# Patient Record
Sex: FEMALE | Race: WHITE | NOT HISPANIC OR LATINO | URBAN - METROPOLITAN AREA
[De-identification: names, ages, dates, MRNs, and addresses within clinical notes are randomized per-mention and may not be internally consistent; named-entity substitution may affect disease eponyms.]

---

## 2017-11-09 ENCOUNTER — OUTPATIENT (OUTPATIENT)
Dept: OUTPATIENT SERVICES | Facility: HOSPITAL | Age: 27
LOS: 1 days | End: 2017-11-09
Payer: COMMERCIAL

## 2017-11-09 VITALS
SYSTOLIC BLOOD PRESSURE: 132 MMHG | DIASTOLIC BLOOD PRESSURE: 85 MMHG | TEMPERATURE: 98 F | RESPIRATION RATE: 14 BRPM | HEART RATE: 75 BPM | HEIGHT: 63 IN | WEIGHT: 173.94 LBS

## 2017-11-09 DIAGNOSIS — Z01.818 ENCOUNTER FOR OTHER PREPROCEDURAL EXAMINATION: ICD-10-CM

## 2017-11-09 DIAGNOSIS — N87.9 DYSPLASIA OF CERVIX UTERI, UNSPECIFIED: ICD-10-CM

## 2017-11-09 DIAGNOSIS — Z98.890 OTHER SPECIFIED POSTPROCEDURAL STATES: Chronic | ICD-10-CM

## 2017-11-09 DIAGNOSIS — Z33.2 ENCOUNTER FOR ELECTIVE TERMINATION OF PREGNANCY: Chronic | ICD-10-CM

## 2017-11-09 DIAGNOSIS — K08.499 PARTIAL LOSS OF TEETH DUE TO OTHER SPECIFIED CAUSE, UNSPECIFIED CLASS: Chronic | ICD-10-CM

## 2017-11-09 LAB
HCG SERPL-ACNC: <1 MIU/ML — SIGNIFICANT CHANGE UP
HCT VFR BLD CALC: 44.8 % — SIGNIFICANT CHANGE UP (ref 34.5–45)
HGB BLD-MCNC: 15.1 G/DL — SIGNIFICANT CHANGE UP (ref 11.5–15.5)
MCHC RBC-ENTMCNC: 30.6 PG — SIGNIFICANT CHANGE UP (ref 27–34)
MCHC RBC-ENTMCNC: 33.7 GM/DL — SIGNIFICANT CHANGE UP (ref 32–36)
MCV RBC AUTO: 90.6 FL — SIGNIFICANT CHANGE UP (ref 80–100)
PLATELET # BLD AUTO: 241 K/UL — SIGNIFICANT CHANGE UP (ref 150–400)
RBC # BLD: 4.94 M/UL — SIGNIFICANT CHANGE UP (ref 3.8–5.2)
RBC # FLD: 11.3 % — SIGNIFICANT CHANGE UP (ref 10.3–14.5)
WBC # BLD: 5.9 K/UL — SIGNIFICANT CHANGE UP (ref 3.8–10.5)
WBC # FLD AUTO: 5.9 K/UL — SIGNIFICANT CHANGE UP (ref 3.8–10.5)

## 2017-11-09 PROCEDURE — G0463: CPT

## 2017-11-09 PROCEDURE — 88321 CONSLTJ&REPRT SLD PREP ELSWR: CPT

## 2017-11-09 PROCEDURE — 86850 RBC ANTIBODY SCREEN: CPT

## 2017-11-09 PROCEDURE — 86901 BLOOD TYPING SEROLOGIC RH(D): CPT

## 2017-11-09 PROCEDURE — 85027 COMPLETE CBC AUTOMATED: CPT

## 2017-11-09 PROCEDURE — 84702 CHORIONIC GONADOTROPIN TEST: CPT

## 2017-11-09 PROCEDURE — 86900 BLOOD TYPING SEROLOGIC ABO: CPT

## 2017-11-09 NOTE — H&P PST ADULT - NSANTHOSAYNRD_GEN_A_CORE
No. KOBE screening performed.  STOP BANG Legend: 0-2 = LOW Risk; 3-4 = INTERMEDIATE Risk; 5-8 = HIGH Risk
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
## 2017-11-09 NOTE — H&P PST ADULT - RS GEN PE MLT RESP DETAILS PC
good air movement/airway patent/breath sounds equal/clear to auscultation bilaterally/respirations non-labored

## 2017-11-09 NOTE — H&P PST ADULT - HISTORY OF PRESENT ILLNESS
27 year old female  presents for PST prior to LEEP Procedure - Endocervical Curettage with Dr Elizalde on 11/15/17 - pt notes h/o abnormal PAP Smear last month - pt notes she then had Colposcopy in office and due to those results pt discussed procedure with Dr Elizalde and is electing for scheduled procedure.

## 2017-11-09 NOTE — H&P PST ADULT - VISION (WITH CORRECTIVE LENSES IF THE PATIENT USUALLY WEARS THEM):
Wears Contact Lenses - instructed pt to wear in eyeglasses on day of surgery/Partially impaired: cannot see medication labels or newsprint, but can see obstacles in path, and the surrounding layout; can count fingers at arm's length

## 2017-11-09 NOTE — H&P PST ADULT - PROBLEM SELECTOR PLAN 1
PST Labs; CBC, HcG, Type & Screen - No medical Clearance needed - pre-op instructions given to pt with understanding verbalized

## 2017-11-09 NOTE — H&P PST ADULT - ASSESSMENT
27 year old female with Dysplasia of cervix Uteri , Unspecified - scheduled for  LEEP Procedure - Endocervical Curettage with Dr Elizalde on 11/15/17 -

## 2017-11-09 NOTE — H&P PST ADULT - FAMILY HISTORY
Father  Still living? Yes, Estimated age: 51-60  Family history of factor V Leiden mutation, Age at diagnosis: Age Unknown

## 2017-11-14 ENCOUNTER — TRANSCRIPTION ENCOUNTER (OUTPATIENT)
Age: 27
End: 2017-11-14

## 2017-11-14 RX ORDER — ACETAMINOPHEN 500 MG
975 TABLET ORAL ONCE
Qty: 0 | Refills: 0 | Status: COMPLETED | OUTPATIENT
Start: 2017-11-15 | End: 2017-11-15

## 2017-11-14 RX ORDER — SODIUM CHLORIDE 9 MG/ML
1000 INJECTION, SOLUTION INTRAVENOUS
Qty: 0 | Refills: 0 | Status: DISCONTINUED | OUTPATIENT
Start: 2017-11-15 | End: 2017-11-15

## 2017-11-15 ENCOUNTER — OUTPATIENT (OUTPATIENT)
Dept: OUTPATIENT SERVICES | Facility: HOSPITAL | Age: 27
LOS: 1 days | End: 2017-11-15
Payer: COMMERCIAL

## 2017-11-15 VITALS
HEART RATE: 73 BPM | SYSTOLIC BLOOD PRESSURE: 112 MMHG | OXYGEN SATURATION: 97 % | DIASTOLIC BLOOD PRESSURE: 60 MMHG | RESPIRATION RATE: 14 BRPM

## 2017-11-15 VITALS
TEMPERATURE: 98 F | RESPIRATION RATE: 16 BRPM | WEIGHT: 175.93 LBS | DIASTOLIC BLOOD PRESSURE: 79 MMHG | SYSTOLIC BLOOD PRESSURE: 135 MMHG | OXYGEN SATURATION: 97 % | HEART RATE: 75 BPM | HEIGHT: 63 IN

## 2017-11-15 DIAGNOSIS — Z01.818 ENCOUNTER FOR OTHER PREPROCEDURAL EXAMINATION: ICD-10-CM

## 2017-11-15 DIAGNOSIS — Z98.890 OTHER SPECIFIED POSTPROCEDURAL STATES: Chronic | ICD-10-CM

## 2017-11-15 DIAGNOSIS — Z33.2 ENCOUNTER FOR ELECTIVE TERMINATION OF PREGNANCY: Chronic | ICD-10-CM

## 2017-11-15 DIAGNOSIS — K08.499 PARTIAL LOSS OF TEETH DUE TO OTHER SPECIFIED CAUSE, UNSPECIFIED CLASS: Chronic | ICD-10-CM

## 2017-11-15 DIAGNOSIS — N87.9 DYSPLASIA OF CERVIX UTERI, UNSPECIFIED: ICD-10-CM

## 2017-11-15 PROCEDURE — 88305 TISSUE EXAM BY PATHOLOGIST: CPT

## 2017-11-15 PROCEDURE — 88305 TISSUE EXAM BY PATHOLOGIST: CPT | Mod: 26

## 2017-11-15 PROCEDURE — 88307 TISSUE EXAM BY PATHOLOGIST: CPT

## 2017-11-15 PROCEDURE — 88307 TISSUE EXAM BY PATHOLOGIST: CPT | Mod: 26

## 2017-11-15 PROCEDURE — 57522 CONIZATION OF CERVIX: CPT

## 2017-11-15 RX ORDER — IBUPROFEN 200 MG
1 TABLET ORAL
Qty: 0 | Refills: 0 | COMMUNITY

## 2017-11-15 RX ORDER — ACETAMINOPHEN 500 MG
2 TABLET ORAL
Qty: 0 | Refills: 0 | COMMUNITY

## 2017-11-15 RX ORDER — NORETHINDRONE AND ETHINYL ESTRADIOL 0.4-0.035
1 KIT ORAL
Qty: 0 | Refills: 0 | COMMUNITY

## 2017-11-15 RX ADMIN — Medication 975 MILLIGRAM(S): at 15:08

## 2017-11-15 RX ADMIN — SODIUM CHLORIDE 30 MILLILITER(S): 9 INJECTION, SOLUTION INTRAVENOUS at 15:08

## 2017-11-15 NOTE — ASU DISCHARGE PLAN (ADULT/PEDIATRIC). - ACTIVITY LEVEL
nothing per vagina/no tub baths/no sports/gym/no douching/no tampons/no intercourse/no exercise/no heavy lifting

## 2017-11-15 NOTE — ASU PATIENT PROFILE, ADULT - VISION (WITH CORRECTIVE LENSES IF THE PATIENT USUALLY WEARS THEM):
Partially impaired: cannot see medication labels or newsprint, but can see obstacles in path, and the surrounding layout; can count fingers at arm's length/Wears Contact Lenses - instructed pt to wear in eyeglasses on day of surgery

## 2017-11-15 NOTE — ASU DISCHARGE PLAN (ADULT/PEDIATRIC). - MEDICATION SUMMARY - MEDICATIONS TO TAKE
I will START or STAY ON the medications listed below when I get home from the hospital:    Tylenol 500 mg oral tablet  -- 2 tab(s) by mouth every 8 hours, As Needed  -- Indication: For pain med    Motrin 800 mg oral tablet  -- 1 tab(s) by mouth 3 times a day, As Needed  -- Indication: For pain med    Loestrin 24 Fe oral tablet  -- 1 tab(s) by mouth once a day (at bedtime)  -- Indication: For home med

## 2017-11-15 NOTE — BRIEF OPERATIVE NOTE - PROCEDURE
<<-----Click on this checkbox to enter Procedure LEEP, with endocervical curettage  11/15/2017    Active  TSANTIAGOE

## 2017-11-15 NOTE — BRIEF OPERATIVE NOTE - OPERATION/FINDINGS
Normal sized anteverted uterus, no adnexal masses. Nonstaining surrounding cervical os. Lesion removed in one pass.

## 2018-10-31 NOTE — ASU PATIENT PROFILE, ADULT - FALL HARM RISK CONCLUSION
[Fatigue] : fatigue [Muscle Weakness] : muscle weakness [Unsteady Walking] : ataxia [Negative] : Heme/Lymph [Recent Change In Weight] : ~T no recent weight change [Chest Pain] : no chest pain [Lower Ext Edema] : no lower extremity edema Universal Safety Interventions [Abdominal Pain] : no abdominal pain [Heartburn] : no heartburn [Melena] : no melena [Suicidal] : not suicidal

## 2019-10-13 NOTE — BRIEF OPERATIVE NOTE - DISPOSITION
Pt presents to the ED with complaints of left upper jaw pain which has been ongoing x 2 months. Denies fever. Bed rails are up and call light is within patient reach. Will continue to monitor.   
home

## 2022-05-31 NOTE — H&P PST ADULT - ANTERIOR CERVICAL R
1. Fasting labs ordered  2. Ref -  Sports medicine - R shoulder  3. Start Miralax daily -   4. Schedule mammogram  5. F/U in 6mth  
normal

## 2022-09-06 PROBLEM — Z00.00 ENCOUNTER FOR PREVENTIVE HEALTH EXAMINATION: Status: ACTIVE | Noted: 2022-09-06

## 2023-08-11 ENCOUNTER — APPOINTMENT (OUTPATIENT)
Dept: ANTEPARTUM | Facility: CLINIC | Age: 33
End: 2023-08-11
Payer: COMMERCIAL

## 2023-08-11 ENCOUNTER — LABORATORY RESULT (OUTPATIENT)
Age: 33
End: 2023-08-11

## 2023-08-11 ENCOUNTER — ASOB RESULT (OUTPATIENT)
Age: 33
End: 2023-08-11

## 2023-08-11 PROCEDURE — 76801 OB US < 14 WKS SINGLE FETUS: CPT

## 2023-08-11 PROCEDURE — 36416 COLLJ CAPILLARY BLOOD SPEC: CPT

## 2023-08-11 PROCEDURE — 76813 OB US NUCHAL MEAS 1 GEST: CPT

## 2023-10-03 ENCOUNTER — ASOB RESULT (OUTPATIENT)
Age: 33
End: 2023-10-03

## 2023-10-03 ENCOUNTER — APPOINTMENT (OUTPATIENT)
Dept: ANTEPARTUM | Facility: CLINIC | Age: 33
End: 2023-10-03
Payer: COMMERCIAL

## 2023-10-03 PROCEDURE — 76811 OB US DETAILED SNGL FETUS: CPT

## 2023-10-03 PROCEDURE — 76817 TRANSVAGINAL US OBSTETRIC: CPT

## 2023-11-12 NOTE — ASU DISCHARGE PLAN (ADULT/PEDIATRIC). - NOTIFY
Pt wears O2 at 2l/nc at home, increased sob for the past few days, started on prednisone and antibx yesterday, neg covid tests at home     Triage Assessment (Adult)       Row Name 11/12/23 1018          Triage Assessment    Airway WDL WDL        Respiratory WDL    Respiratory WDL rhythm/pattern     Rhythm/Pattern, Respiratory shortness of breath        Cognitive/Neuro/Behavioral WDL    Cognitive/Neuro/Behavioral WDL WDL                      Persistent Nausea and Vomiting/Fever greater than 101/Pain not relieved by Medications/Unable to Urinate/Bleeding that does not stop s/p rt kidney tranplant 2 weeks ago, had a follow visit with kidney doctor today and had blood work on with elevated K level and also pt c/o elevated BP and generalized weakness.

## 2024-01-06 ENCOUNTER — INPATIENT (INPATIENT)
Facility: HOSPITAL | Age: 34
LOS: 5 days | Discharge: HOME | End: 2024-01-12
Attending: STUDENT IN AN ORGANIZED HEALTH CARE EDUCATION/TRAINING PROGRAM | Admitting: STUDENT IN AN ORGANIZED HEALTH CARE EDUCATION/TRAINING PROGRAM
Payer: COMMERCIAL

## 2024-01-06 ENCOUNTER — TRANSCRIPTION ENCOUNTER (OUTPATIENT)
Age: 34
End: 2024-01-06

## 2024-01-06 VITALS
SYSTOLIC BLOOD PRESSURE: 133 MMHG | HEART RATE: 82 BPM | TEMPERATURE: 98 F | RESPIRATION RATE: 16 BRPM | DIASTOLIC BLOOD PRESSURE: 82 MMHG

## 2024-01-06 DIAGNOSIS — O42.90 PREMATURE RUPTURE OF MEMBRANES, UNSPECIFIED AS TO LENGTH OF TIME BETWEEN RUPTURE AND ONSET OF LABOR, UNSPECIFIED WEEKS OF GESTATION: ICD-10-CM

## 2024-01-06 DIAGNOSIS — Z96.22 MYRINGOTOMY TUBE(S) STATUS: Chronic | ICD-10-CM

## 2024-01-06 DIAGNOSIS — O26.899 OTHER SPECIFIED PREGNANCY RELATED CONDITIONS, UNSPECIFIED TRIMESTER: ICD-10-CM

## 2024-01-06 DIAGNOSIS — Z98.890 OTHER SPECIFIED POSTPROCEDURAL STATES: Chronic | ICD-10-CM

## 2024-01-06 DIAGNOSIS — Z33.2 ENCOUNTER FOR ELECTIVE TERMINATION OF PREGNANCY: Chronic | ICD-10-CM

## 2024-01-06 LAB
APPEARANCE UR: CLEAR — SIGNIFICANT CHANGE UP
APPEARANCE UR: CLEAR — SIGNIFICANT CHANGE UP
BASOPHILS # BLD AUTO: 0.02 K/UL — SIGNIFICANT CHANGE UP (ref 0–0.2)
BASOPHILS # BLD AUTO: 0.02 K/UL — SIGNIFICANT CHANGE UP (ref 0–0.2)
BASOPHILS NFR BLD AUTO: 0.2 % — SIGNIFICANT CHANGE UP (ref 0–2)
BASOPHILS NFR BLD AUTO: 0.2 % — SIGNIFICANT CHANGE UP (ref 0–2)
BILIRUB UR-MCNC: NEGATIVE — SIGNIFICANT CHANGE UP
BILIRUB UR-MCNC: NEGATIVE — SIGNIFICANT CHANGE UP
BLD GP AB SCN SERPL QL: NEGATIVE — SIGNIFICANT CHANGE UP
BLD GP AB SCN SERPL QL: NEGATIVE — SIGNIFICANT CHANGE UP
COLOR SPEC: YELLOW — SIGNIFICANT CHANGE UP
COLOR SPEC: YELLOW — SIGNIFICANT CHANGE UP
DIFF PNL FLD: NEGATIVE — SIGNIFICANT CHANGE UP
DIFF PNL FLD: NEGATIVE — SIGNIFICANT CHANGE UP
EOSINOPHIL # BLD AUTO: 0.02 K/UL — SIGNIFICANT CHANGE UP (ref 0–0.5)
EOSINOPHIL # BLD AUTO: 0.02 K/UL — SIGNIFICANT CHANGE UP (ref 0–0.5)
EOSINOPHIL NFR BLD AUTO: 0.2 % — SIGNIFICANT CHANGE UP (ref 0–6)
EOSINOPHIL NFR BLD AUTO: 0.2 % — SIGNIFICANT CHANGE UP (ref 0–6)
GLUCOSE UR QL: NEGATIVE MG/DL — SIGNIFICANT CHANGE UP
GLUCOSE UR QL: NEGATIVE MG/DL — SIGNIFICANT CHANGE UP
HCT VFR BLD CALC: 36.3 % — SIGNIFICANT CHANGE UP (ref 34.5–45)
HCT VFR BLD CALC: 36.3 % — SIGNIFICANT CHANGE UP (ref 34.5–45)
HGB BLD-MCNC: 12.7 G/DL — SIGNIFICANT CHANGE UP (ref 11.5–15.5)
HGB BLD-MCNC: 12.7 G/DL — SIGNIFICANT CHANGE UP (ref 11.5–15.5)
IANC: 9.05 K/UL — HIGH (ref 1.8–7.4)
IANC: 9.05 K/UL — HIGH (ref 1.8–7.4)
IMM GRANULOCYTES NFR BLD AUTO: 0.4 % — SIGNIFICANT CHANGE UP (ref 0–0.9)
IMM GRANULOCYTES NFR BLD AUTO: 0.4 % — SIGNIFICANT CHANGE UP (ref 0–0.9)
KETONES UR-MCNC: NEGATIVE MG/DL — SIGNIFICANT CHANGE UP
KETONES UR-MCNC: NEGATIVE MG/DL — SIGNIFICANT CHANGE UP
LEUKOCYTE ESTERASE UR-ACNC: NEGATIVE — SIGNIFICANT CHANGE UP
LEUKOCYTE ESTERASE UR-ACNC: NEGATIVE — SIGNIFICANT CHANGE UP
LYMPHOCYTES # BLD AUTO: 1.41 K/UL — SIGNIFICANT CHANGE UP (ref 1–3.3)
LYMPHOCYTES # BLD AUTO: 1.41 K/UL — SIGNIFICANT CHANGE UP (ref 1–3.3)
LYMPHOCYTES # BLD AUTO: 12.8 % — LOW (ref 13–44)
LYMPHOCYTES # BLD AUTO: 12.8 % — LOW (ref 13–44)
MCHC RBC-ENTMCNC: 30 PG — SIGNIFICANT CHANGE UP (ref 27–34)
MCHC RBC-ENTMCNC: 30 PG — SIGNIFICANT CHANGE UP (ref 27–34)
MCHC RBC-ENTMCNC: 35 GM/DL — SIGNIFICANT CHANGE UP (ref 32–36)
MCHC RBC-ENTMCNC: 35 GM/DL — SIGNIFICANT CHANGE UP (ref 32–36)
MCV RBC AUTO: 85.8 FL — SIGNIFICANT CHANGE UP (ref 80–100)
MCV RBC AUTO: 85.8 FL — SIGNIFICANT CHANGE UP (ref 80–100)
MONOCYTES # BLD AUTO: 0.5 K/UL — SIGNIFICANT CHANGE UP (ref 0–0.9)
MONOCYTES # BLD AUTO: 0.5 K/UL — SIGNIFICANT CHANGE UP (ref 0–0.9)
MONOCYTES NFR BLD AUTO: 4.5 % — SIGNIFICANT CHANGE UP (ref 2–14)
MONOCYTES NFR BLD AUTO: 4.5 % — SIGNIFICANT CHANGE UP (ref 2–14)
NEUTROPHILS # BLD AUTO: 9.05 K/UL — HIGH (ref 1.8–7.4)
NEUTROPHILS # BLD AUTO: 9.05 K/UL — HIGH (ref 1.8–7.4)
NEUTROPHILS NFR BLD AUTO: 81.9 % — HIGH (ref 43–77)
NEUTROPHILS NFR BLD AUTO: 81.9 % — HIGH (ref 43–77)
NITRITE UR-MCNC: NEGATIVE — SIGNIFICANT CHANGE UP
NITRITE UR-MCNC: NEGATIVE — SIGNIFICANT CHANGE UP
NRBC # BLD: 0 /100 WBCS — SIGNIFICANT CHANGE UP (ref 0–0)
NRBC # BLD: 0 /100 WBCS — SIGNIFICANT CHANGE UP (ref 0–0)
NRBC # FLD: 0 K/UL — SIGNIFICANT CHANGE UP (ref 0–0)
NRBC # FLD: 0 K/UL — SIGNIFICANT CHANGE UP (ref 0–0)
PH UR: 6 — SIGNIFICANT CHANGE UP (ref 5–8)
PH UR: 6 — SIGNIFICANT CHANGE UP (ref 5–8)
PLATELET # BLD AUTO: 195 K/UL — SIGNIFICANT CHANGE UP (ref 150–400)
PLATELET # BLD AUTO: 195 K/UL — SIGNIFICANT CHANGE UP (ref 150–400)
PROT UR-MCNC: NEGATIVE MG/DL — SIGNIFICANT CHANGE UP
PROT UR-MCNC: NEGATIVE MG/DL — SIGNIFICANT CHANGE UP
RBC # BLD: 4.23 M/UL — SIGNIFICANT CHANGE UP (ref 3.8–5.2)
RBC # BLD: 4.23 M/UL — SIGNIFICANT CHANGE UP (ref 3.8–5.2)
RBC # FLD: 12.8 % — SIGNIFICANT CHANGE UP (ref 10.3–14.5)
RBC # FLD: 12.8 % — SIGNIFICANT CHANGE UP (ref 10.3–14.5)
RH IG SCN BLD-IMP: POSITIVE — SIGNIFICANT CHANGE UP
SP GR SPEC: 1.01 — SIGNIFICANT CHANGE UP (ref 1–1.03)
SP GR SPEC: 1.01 — SIGNIFICANT CHANGE UP (ref 1–1.03)
UROBILINOGEN FLD QL: 0.2 MG/DL — SIGNIFICANT CHANGE UP (ref 0.2–1)
UROBILINOGEN FLD QL: 0.2 MG/DL — SIGNIFICANT CHANGE UP (ref 0.2–1)
WBC # BLD: 11.04 K/UL — HIGH (ref 3.8–10.5)
WBC # BLD: 11.04 K/UL — HIGH (ref 3.8–10.5)
WBC # FLD AUTO: 11.04 K/UL — HIGH (ref 3.8–10.5)
WBC # FLD AUTO: 11.04 K/UL — HIGH (ref 3.8–10.5)

## 2024-01-06 RX ORDER — ERYTHROMYCIN ETHYLSUCCINATE 400 MG
TABLET ORAL
Refills: 0 | Status: DISCONTINUED | OUTPATIENT
Start: 2024-01-06 | End: 2024-01-06

## 2024-01-06 RX ORDER — INFLUENZA VIRUS VACCINE 15; 15; 15; 15 UG/.5ML; UG/.5ML; UG/.5ML; UG/.5ML
0.5 SUSPENSION INTRAMUSCULAR ONCE
Refills: 0 | Status: COMPLETED | OUTPATIENT
Start: 2024-01-06 | End: 2024-01-06

## 2024-01-06 RX ORDER — SODIUM CHLORIDE 9 MG/ML
1000 INJECTION, SOLUTION INTRAVENOUS
Refills: 0 | Status: DISCONTINUED | OUTPATIENT
Start: 2024-01-06 | End: 2024-01-12

## 2024-01-06 RX ORDER — AZITHROMYCIN 500 MG/1
1000 TABLET, FILM COATED ORAL ONCE
Refills: 0 | Status: COMPLETED | OUTPATIENT
Start: 2024-01-06 | End: 2024-01-06

## 2024-01-06 RX ORDER — SODIUM CHLORIDE 9 MG/ML
1000 INJECTION, SOLUTION INTRAVENOUS
Refills: 0 | Status: DISCONTINUED | OUTPATIENT
Start: 2024-01-06 | End: 2024-01-06

## 2024-01-06 RX ORDER — GENTAMICIN SULFATE 40 MG/ML
400 VIAL (ML) INJECTION DAILY
Refills: 0 | Status: DISCONTINUED | OUTPATIENT
Start: 2024-01-06 | End: 2024-01-07

## 2024-01-06 RX ORDER — CHLORHEXIDINE GLUCONATE 213 G/1000ML
1 SOLUTION TOPICAL DAILY
Refills: 0 | Status: DISCONTINUED | OUTPATIENT
Start: 2024-01-06 | End: 2024-01-06

## 2024-01-06 RX ORDER — CHLORHEXIDINE GLUCONATE 213 G/1000ML
1 SOLUTION TOPICAL DAILY
Refills: 0 | Status: DISCONTINUED | OUTPATIENT
Start: 2024-01-06 | End: 2024-01-12

## 2024-01-06 RX ADMIN — AZITHROMYCIN 1000 MILLIGRAM(S): 500 TABLET, FILM COATED ORAL at 15:48

## 2024-01-06 RX ADMIN — Medication 100 MILLIGRAM(S): at 15:45

## 2024-01-06 RX ADMIN — SODIUM CHLORIDE 125 MILLILITER(S): 9 INJECTION, SOLUTION INTRAVENOUS at 15:45

## 2024-01-06 RX ADMIN — Medication 250 MILLIGRAM(S): at 16:31

## 2024-01-06 RX ADMIN — Medication 100 MILLIGRAM(S): at 23:44

## 2024-01-06 RX ADMIN — Medication 12 MILLIGRAM(S): at 14:55

## 2024-01-06 RX ADMIN — CHLORHEXIDINE GLUCONATE 1 APPLICATION(S): 213 SOLUTION TOPICAL at 15:46

## 2024-01-06 NOTE — OB PROVIDER TRIAGE NOTE - NSHPPHYSICALEXAM_GEN_ALL_CORE
pt seen and examined    ICU Vital Signs Last 24 Hrs  T(C): 37.0 (06 Jan 2024 14:42), Max: 37.0 (06 Jan 2024 14:42)  T(F): 98.6 (06 Jan 2024 14:42), Max: 98.6 (06 Jan 2024 14:42)  HR: 83 (06 Jan 2024 15:12) (75 - 92)  BP: 131/76 (06 Jan 2024 15:12) (125/72 - 135/77)  RR: 14 (06 Jan 2024 14:42) (14 - 16)  SpO2: 97% (06 Jan 2024 14:28) (93% - 99%)  pt in NAD   lungs clear   heart s1 s2   abd soft  gravid non tender    placed on EFM     NST  reactive     with accelerations  moderate variability  no decelerations

## 2024-01-06 NOTE — OB RN TRIAGE NOTE - FALL HARM RISK - UNIVERSAL INTERVENTIONS
Bed in lowest position, wheels locked, appropriate side rails in place/Call bell, personal items and telephone in reach/Instruct patient to call for assistance before getting out of bed or chair/Non-slip footwear when patient is out of bed/[place] to call system/Physically safe environment - no spills, clutter or unnecessary equipment/Purposeful Proactive Rounding/Room/bathroom lighting operational, light cord in reach Bed in lowest position, wheels locked, appropriate side rails in place/Call bell, personal items and telephone in reach/Instruct patient to call for assistance before getting out of bed or chair/Non-slip footwear when patient is out of bed/[place] to call system/Physically safe environment - no spills, clutter or unnecessary equipment/Purposeful Proactive Rounding/Room/bathroom lighting operational, light cord in reach

## 2024-01-06 NOTE — OB RN PATIENT PROFILE - CAREGIVER
Take 5mg of Zomig as needed for migraine.    ?? Reglan in future for nausea/vomiting.    ?? Dr. Troy (memory specialist).   Declines

## 2024-01-06 NOTE — OB PROVIDER H&P - NSNYCREQUIREMENTS_OBGYN_ALL_OB
Patient needs a refill for testing strips her meter is a Accu-Chek Guide.  
Rx sent for  test strips  
URIEL

## 2024-01-06 NOTE — OB RN PATIENT PROFILE - CURRENT PREGNANCY COMPLICATIONS, OB PROFILE
deformity or signs of injury. Neurological: He is alert. Skin: Skin is warm and dry. Capillary refill takes less than 3 seconds. Turgor is normal. No petechiae noted. No jaundice. DIAGNOSTIC RESULTS     RADIOLOGY:   Non-plain film images such as CT, Ultrasound and MRI are read by the radiologist. Plain radiographic images are visualized and preliminarily interpreted by Lobo Kaplan PA-C with the below findings:    NAD  Interpretation per the Radiologist below, if available at the time of this note:    XR CHEST STANDARD (2 VW)    (Results Pending)       LABS:  Labs Reviewed   RAPID INFLUENZA A/B ANTIGENS   RSV RAPID ANTIGEN       All other labs were within normal range or not returned as of this dictation. EMERGENCY DEPARTMENT COURSE and DIFFERENTIAL DIAGNOSIS/MDM:   Vitals:    Vitals:    01/08/18 1915   Pulse: 145   Resp: 28   Temp: 99.9 °F (37.7 °C)   TempSrc: Rectal   SpO2: 99%   Weight: (!) 19 lb 2.9 oz (8.7 kg)       REASSESSMENT     ED Course        Patient presents to the emergency department with mom after having URI symptoms for the past 3 weeks. Mom does state that he seemed to worsen the past few days. Tylenol has not been helping the symptoms. Given the extensive amount time the symptoms been ongoing and will use a short course of antibiotics for the patient to ensure no bacterial etiology is the causative factors of the symptoms. Follow-up with PCP in the next 3-4 days    MDM    PROCEDURES:    Procedures      FINAL IMPRESSION      1.  Upper respiratory tract infection, unspecified type          DISPOSITION/PLAN   DISPOSITION Decision To Discharge 01/08/2018 09:02:40 PM      PATIENT REFERRED TO:  Delio Fontaine MD  [address]  [phone]    Call in 3 days        DISCHARGE MEDICATIONS:  New Prescriptions    AZITHROMYCIN (ZITHROMAX) 100 MG/5ML SUSPENSION    Take 2 mLs by mouth daily for 5 days       (Please note that portions of this note were completed with a  Premature Rupture of Membranes (PPROM)/Gestational Age less than 36 Weeks/Maternal Unknown GBS

## 2024-01-06 NOTE — OB PROVIDER TRIAGE NOTE - HISTORY OF PRESENT ILLNESS
33 year old female  at 33.3 weeks gestation who stated LOF at 1140a and feels cramping    feels good FM  feels mild cramping     PNC  WHP     fetus breech on 1/3 scan for CS 2/16 if still breech    hx of LEEP normal cx lengths

## 2024-01-06 NOTE — OB PROVIDER H&P - ASSESSMENT
33 year old female P0 at 33.3 week gestation BREECH /pprom    for expectant management     betamethasone for FLM     latency antibiotic coverage      NICU consult    maintain NPO status    admission labs sent      PRIMITIVO MCKENZIE

## 2024-01-06 NOTE — OB RN PATIENT PROFILE - NAME OF FATHER, OB PROFILE
Patient is due for DM eye exam.   Referrals for both Dr Fidelia Rodriguez and Dr Sylvester Del Valle authorized. Patient states that he had exam done with Dr Sylvester Del Valle one month ago. Parth Templeton at Dr Valente Apley office will fax to PCP.
Vincent Lewis

## 2024-01-06 NOTE — OB RN PATIENT PROFILE - IN THE PAST 12 MONTHS HAVE YOU USED DRUGS OTHER THAN THOSE REQUIRED FOR MEDICAL REASON?
----- Message from Marcus Mcclure MD sent at 6/21/2021  2:59 PM CDT -----  Stable renal function with creatinine 2.83.  PT/PTT within normal limits.  Patient is medically cleared to proceed with elective surgery for spinal stenosis.   No

## 2024-01-06 NOTE — OB RN PATIENT PROFILE - FALL HARM RISK - UNIVERSAL INTERVENTIONS
Bed in lowest position, wheels locked, appropriate side rails in place/Call bell, personal items and telephone in reach/Instruct patient to call for assistance before getting out of bed or chair/Non-slip footwear when patient is out of bed/[place] to call system/Physically safe environment - no spills, clutter or unnecessary equipment/Purposeful Proactive Rounding/Room/bathroom lighting operational, light cord in reach Bed in lowest position, wheels locked, appropriate side rails in place/Call bell, personal items and telephone in reach/Instruct patient to call for assistance before getting out of bed or chair/Non-slip footwear when patient is out of bed/[place] to call system/Physically safe environment - no spills, clutter or unnecessary equipment/Purposeful Proactive Rounding/Room/bathroom lighting operational, light cord in reach

## 2024-01-06 NOTE — CHART NOTE - NSCHARTNOTEFT_GEN_A_CORE
NICU consulted placed   Update: Confirmed NICU to see patient per MICU Fellow Mina. Amyeo Afroz Jereen, PGY-3 NICU consulted placed 1/6 @2300  Update: Confirmed NICU to see patient per NICU Fellow Aayush. 1/7 @0500    Amyeo Afroz Jereen, PGY-3

## 2024-01-07 ENCOUNTER — TRANSCRIPTION ENCOUNTER (OUTPATIENT)
Age: 34
End: 2024-01-07

## 2024-01-07 LAB
ANION GAP SERPL CALC-SCNC: 16 MMOL/L — HIGH (ref 7–14)
ANION GAP SERPL CALC-SCNC: 16 MMOL/L — HIGH (ref 7–14)
BASOPHILS # BLD AUTO: 0.01 K/UL — SIGNIFICANT CHANGE UP (ref 0–0.2)
BASOPHILS # BLD AUTO: 0.01 K/UL — SIGNIFICANT CHANGE UP (ref 0–0.2)
BASOPHILS NFR BLD AUTO: 0.1 % — SIGNIFICANT CHANGE UP (ref 0–2)
BASOPHILS NFR BLD AUTO: 0.1 % — SIGNIFICANT CHANGE UP (ref 0–2)
BUN SERPL-MCNC: 11 MG/DL — SIGNIFICANT CHANGE UP (ref 7–23)
BUN SERPL-MCNC: 11 MG/DL — SIGNIFICANT CHANGE UP (ref 7–23)
CALCIUM SERPL-MCNC: 9.3 MG/DL — SIGNIFICANT CHANGE UP (ref 8.4–10.5)
CALCIUM SERPL-MCNC: 9.3 MG/DL — SIGNIFICANT CHANGE UP (ref 8.4–10.5)
CHLORIDE SERPL-SCNC: 99 MMOL/L — SIGNIFICANT CHANGE UP (ref 98–107)
CHLORIDE SERPL-SCNC: 99 MMOL/L — SIGNIFICANT CHANGE UP (ref 98–107)
CO2 SERPL-SCNC: 18 MMOL/L — LOW (ref 22–31)
CO2 SERPL-SCNC: 18 MMOL/L — LOW (ref 22–31)
CREAT SERPL-MCNC: 0.59 MG/DL — SIGNIFICANT CHANGE UP (ref 0.5–1.3)
CREAT SERPL-MCNC: 0.59 MG/DL — SIGNIFICANT CHANGE UP (ref 0.5–1.3)
EGFR: 121 ML/MIN/1.73M2 — SIGNIFICANT CHANGE UP
EGFR: 121 ML/MIN/1.73M2 — SIGNIFICANT CHANGE UP
EOSINOPHIL # BLD AUTO: 0 K/UL — SIGNIFICANT CHANGE UP (ref 0–0.5)
EOSINOPHIL # BLD AUTO: 0 K/UL — SIGNIFICANT CHANGE UP (ref 0–0.5)
EOSINOPHIL NFR BLD AUTO: 0 % — SIGNIFICANT CHANGE UP (ref 0–6)
EOSINOPHIL NFR BLD AUTO: 0 % — SIGNIFICANT CHANGE UP (ref 0–6)
GLUCOSE SERPL-MCNC: 79 MG/DL — SIGNIFICANT CHANGE UP (ref 70–99)
GLUCOSE SERPL-MCNC: 79 MG/DL — SIGNIFICANT CHANGE UP (ref 70–99)
HCT VFR BLD CALC: 33.9 % — LOW (ref 34.5–45)
HCT VFR BLD CALC: 33.9 % — LOW (ref 34.5–45)
HGB BLD-MCNC: 12 G/DL — SIGNIFICANT CHANGE UP (ref 11.5–15.5)
HGB BLD-MCNC: 12 G/DL — SIGNIFICANT CHANGE UP (ref 11.5–15.5)
IANC: 8.6 K/UL — HIGH (ref 1.8–7.4)
IANC: 8.6 K/UL — HIGH (ref 1.8–7.4)
IMM GRANULOCYTES NFR BLD AUTO: 0.4 % — SIGNIFICANT CHANGE UP (ref 0–0.9)
IMM GRANULOCYTES NFR BLD AUTO: 0.4 % — SIGNIFICANT CHANGE UP (ref 0–0.9)
LYMPHOCYTES # BLD AUTO: 0.97 K/UL — LOW (ref 1–3.3)
LYMPHOCYTES # BLD AUTO: 0.97 K/UL — LOW (ref 1–3.3)
LYMPHOCYTES # BLD AUTO: 9.8 % — LOW (ref 13–44)
LYMPHOCYTES # BLD AUTO: 9.8 % — LOW (ref 13–44)
MCHC RBC-ENTMCNC: 30 PG — SIGNIFICANT CHANGE UP (ref 27–34)
MCHC RBC-ENTMCNC: 30 PG — SIGNIFICANT CHANGE UP (ref 27–34)
MCHC RBC-ENTMCNC: 35.4 GM/DL — SIGNIFICANT CHANGE UP (ref 32–36)
MCHC RBC-ENTMCNC: 35.4 GM/DL — SIGNIFICANT CHANGE UP (ref 32–36)
MCV RBC AUTO: 84.8 FL — SIGNIFICANT CHANGE UP (ref 80–100)
MCV RBC AUTO: 84.8 FL — SIGNIFICANT CHANGE UP (ref 80–100)
MONOCYTES # BLD AUTO: 0.24 K/UL — SIGNIFICANT CHANGE UP (ref 0–0.9)
MONOCYTES # BLD AUTO: 0.24 K/UL — SIGNIFICANT CHANGE UP (ref 0–0.9)
MONOCYTES NFR BLD AUTO: 2.4 % — SIGNIFICANT CHANGE UP (ref 2–14)
MONOCYTES NFR BLD AUTO: 2.4 % — SIGNIFICANT CHANGE UP (ref 2–14)
NEUTROPHILS # BLD AUTO: 8.6 K/UL — HIGH (ref 1.8–7.4)
NEUTROPHILS # BLD AUTO: 8.6 K/UL — HIGH (ref 1.8–7.4)
NEUTROPHILS NFR BLD AUTO: 87.3 % — HIGH (ref 43–77)
NEUTROPHILS NFR BLD AUTO: 87.3 % — HIGH (ref 43–77)
NRBC # BLD: 0 /100 WBCS — SIGNIFICANT CHANGE UP (ref 0–0)
NRBC # BLD: 0 /100 WBCS — SIGNIFICANT CHANGE UP (ref 0–0)
NRBC # FLD: 0 K/UL — SIGNIFICANT CHANGE UP (ref 0–0)
NRBC # FLD: 0 K/UL — SIGNIFICANT CHANGE UP (ref 0–0)
PLATELET # BLD AUTO: 187 K/UL — SIGNIFICANT CHANGE UP (ref 150–400)
PLATELET # BLD AUTO: 187 K/UL — SIGNIFICANT CHANGE UP (ref 150–400)
POTASSIUM SERPL-MCNC: 4.1 MMOL/L — SIGNIFICANT CHANGE UP (ref 3.5–5.3)
POTASSIUM SERPL-MCNC: 4.1 MMOL/L — SIGNIFICANT CHANGE UP (ref 3.5–5.3)
POTASSIUM SERPL-SCNC: 4.1 MMOL/L — SIGNIFICANT CHANGE UP (ref 3.5–5.3)
POTASSIUM SERPL-SCNC: 4.1 MMOL/L — SIGNIFICANT CHANGE UP (ref 3.5–5.3)
RBC # BLD: 4 M/UL — SIGNIFICANT CHANGE UP (ref 3.8–5.2)
RBC # BLD: 4 M/UL — SIGNIFICANT CHANGE UP (ref 3.8–5.2)
RBC # FLD: 12.7 % — SIGNIFICANT CHANGE UP (ref 10.3–14.5)
RBC # FLD: 12.7 % — SIGNIFICANT CHANGE UP (ref 10.3–14.5)
SODIUM SERPL-SCNC: 133 MMOL/L — LOW (ref 135–145)
SODIUM SERPL-SCNC: 133 MMOL/L — LOW (ref 135–145)
T PALLIDUM AB TITR SER: NEGATIVE — SIGNIFICANT CHANGE UP
T PALLIDUM AB TITR SER: NEGATIVE — SIGNIFICANT CHANGE UP
WBC # BLD: 9.86 K/UL — SIGNIFICANT CHANGE UP (ref 3.8–10.5)
WBC # BLD: 9.86 K/UL — SIGNIFICANT CHANGE UP (ref 3.8–10.5)
WBC # FLD AUTO: 9.86 K/UL — SIGNIFICANT CHANGE UP (ref 3.8–10.5)
WBC # FLD AUTO: 9.86 K/UL — SIGNIFICANT CHANGE UP (ref 3.8–10.5)

## 2024-01-07 PROCEDURE — 88307 TISSUE EXAM BY PATHOLOGIST: CPT | Mod: 26

## 2024-01-07 RX ORDER — LANOLIN
1 OINTMENT (GRAM) TOPICAL EVERY 6 HOURS
Refills: 0 | Status: DISCONTINUED | OUTPATIENT
Start: 2024-01-07 | End: 2024-01-12

## 2024-01-07 RX ORDER — IBUPROFEN 200 MG
600 TABLET ORAL EVERY 6 HOURS
Refills: 0 | Status: COMPLETED | OUTPATIENT
Start: 2024-01-07 | End: 2024-12-05

## 2024-01-07 RX ORDER — SODIUM CHLORIDE 9 MG/ML
1000 INJECTION, SOLUTION INTRAVENOUS
Refills: 0 | Status: DISCONTINUED | OUTPATIENT
Start: 2024-01-07 | End: 2024-01-12

## 2024-01-07 RX ORDER — KETOROLAC TROMETHAMINE 30 MG/ML
30 SYRINGE (ML) INJECTION EVERY 6 HOURS
Refills: 0 | Status: DISCONTINUED | OUTPATIENT
Start: 2024-01-07 | End: 2024-01-08

## 2024-01-07 RX ORDER — OXYCODONE HYDROCHLORIDE 5 MG/1
5 TABLET ORAL
Refills: 0 | Status: DISCONTINUED | OUTPATIENT
Start: 2024-01-07 | End: 2024-01-12

## 2024-01-07 RX ORDER — CITRIC ACID/SODIUM CITRATE 300-500 MG
30 SOLUTION, ORAL ORAL ONCE
Refills: 0 | Status: COMPLETED | OUTPATIENT
Start: 2024-01-07 | End: 2024-01-07

## 2024-01-07 RX ORDER — HEPARIN SODIUM 5000 [USP'U]/ML
5000 INJECTION INTRAVENOUS; SUBCUTANEOUS EVERY 12 HOURS
Refills: 0 | Status: DISCONTINUED | OUTPATIENT
Start: 2024-01-07 | End: 2024-01-12

## 2024-01-07 RX ORDER — MAGNESIUM HYDROXIDE 400 MG/1
30 TABLET, CHEWABLE ORAL
Refills: 0 | Status: DISCONTINUED | OUTPATIENT
Start: 2024-01-07 | End: 2024-01-12

## 2024-01-07 RX ORDER — SIMETHICONE 80 MG/1
80 TABLET, CHEWABLE ORAL EVERY 4 HOURS
Refills: 0 | Status: DISCONTINUED | OUTPATIENT
Start: 2024-01-07 | End: 2024-01-12

## 2024-01-07 RX ORDER — ONDANSETRON 8 MG/1
4 TABLET, FILM COATED ORAL EVERY 6 HOURS
Refills: 0 | Status: DISCONTINUED | OUTPATIENT
Start: 2024-01-07 | End: 2024-01-12

## 2024-01-07 RX ORDER — ACETAMINOPHEN 500 MG
975 TABLET ORAL
Refills: 0 | Status: DISCONTINUED | OUTPATIENT
Start: 2024-01-07 | End: 2024-01-12

## 2024-01-07 RX ORDER — FAMOTIDINE 10 MG/ML
20 INJECTION INTRAVENOUS ONCE
Refills: 0 | Status: COMPLETED | OUTPATIENT
Start: 2024-01-07 | End: 2024-01-07

## 2024-01-07 RX ORDER — OXYCODONE HYDROCHLORIDE 5 MG/1
5 TABLET ORAL ONCE
Refills: 0 | Status: DISCONTINUED | OUTPATIENT
Start: 2024-01-07 | End: 2024-01-12

## 2024-01-07 RX ORDER — OXYTOCIN 10 UNIT/ML
333.33 VIAL (ML) INJECTION
Qty: 20 | Refills: 0 | Status: DISCONTINUED | OUTPATIENT
Start: 2024-01-07 | End: 2024-01-12

## 2024-01-07 RX ORDER — NALOXONE HYDROCHLORIDE 4 MG/.1ML
0.1 SPRAY NASAL
Refills: 0 | Status: DISCONTINUED | OUTPATIENT
Start: 2024-01-07 | End: 2024-01-12

## 2024-01-07 RX ORDER — TETANUS TOXOID, REDUCED DIPHTHERIA TOXOID AND ACELLULAR PERTUSSIS VACCINE, ADSORBED 5; 2.5; 8; 8; 2.5 [IU]/.5ML; [IU]/.5ML; UG/.5ML; UG/.5ML; UG/.5ML
0.5 SUSPENSION INTRAMUSCULAR ONCE
Refills: 0 | Status: DISCONTINUED | OUTPATIENT
Start: 2024-01-07 | End: 2024-01-12

## 2024-01-07 RX ORDER — MORPHINE SULFATE 50 MG/1
0.1 CAPSULE, EXTENDED RELEASE ORAL ONCE
Refills: 0 | Status: DISCONTINUED | OUTPATIENT
Start: 2024-01-07 | End: 2024-01-12

## 2024-01-07 RX ORDER — DIPHENHYDRAMINE HCL 50 MG
25 CAPSULE ORAL EVERY 6 HOURS
Refills: 0 | Status: DISCONTINUED | OUTPATIENT
Start: 2024-01-07 | End: 2024-01-12

## 2024-01-07 RX ORDER — ACETAMINOPHEN 500 MG
975 TABLET ORAL ONCE
Refills: 0 | Status: COMPLETED | OUTPATIENT
Start: 2024-01-07 | End: 2024-01-07

## 2024-01-07 RX ORDER — DEXAMETHASONE 0.5 MG/5ML
4 ELIXIR ORAL EVERY 6 HOURS
Refills: 0 | Status: DISCONTINUED | OUTPATIENT
Start: 2024-01-07 | End: 2024-01-12

## 2024-01-07 RX ADMIN — Medication 30 MILLILITER(S): at 11:19

## 2024-01-07 RX ADMIN — Medication 30 MILLIGRAM(S): at 20:37

## 2024-01-07 RX ADMIN — FAMOTIDINE 20 MILLIGRAM(S): 10 INJECTION INTRAVENOUS at 11:19

## 2024-01-07 RX ADMIN — Medication 30 MILLIGRAM(S): at 21:37

## 2024-01-07 RX ADMIN — Medication 100 MILLIGRAM(S): at 07:53

## 2024-01-07 RX ADMIN — Medication 975 MILLIGRAM(S): at 08:25

## 2024-01-07 RX ADMIN — HEPARIN SODIUM 5000 UNIT(S): 5000 INJECTION INTRAVENOUS; SUBCUTANEOUS at 20:36

## 2024-01-07 RX ADMIN — ONDANSETRON 4 MILLIGRAM(S): 8 TABLET, FILM COATED ORAL at 21:48

## 2024-01-07 RX ADMIN — Medication 975 MILLIGRAM(S): at 07:46

## 2024-01-07 NOTE — DISCHARGE NOTE OB - MEDICATION SUMMARY - MEDICATIONS TO TAKE
I will START or STAY ON the medications listed below when I get home from the hospital:    ibuprofen 600 mg oral tablet  -- 1 tab(s) by mouth every 6 hours as needed for  moderate pain  -- Indication: For pain    acetaminophen 325 mg oral tablet  -- 3 tab(s) by mouth every 6 hours as needed for  moderate pain  -- Indication: For pain    ferrous sulfate 325 mg (65 mg elemental iron) oral tablet  -- 1 tab(s) by mouth once a day  -- Indication: For Anemia    ascorbic acid 500 mg oral tablet  -- 1 tab(s) by mouth once a day  -- Indication: For nutrition   I will START or STAY ON the medications listed below when I get home from the hospital:    ibuprofen 600 mg oral tablet  -- 1 tab(s) by mouth every 6 hours as needed for  moderate pain  -- Indication: For pain    acetaminophen 325 mg oral tablet  -- 3 tab(s) by mouth every 6 hours as needed for  moderate pain  -- Indication: For pain    valACYclovir 1 g oral tablet  -- 1 tab(s) by mouth once a day  -- Indication: For HSV     NIFEdipine 30 mg oral tablet, extended release  -- 1 tab(s) by mouth every 24 hours hold for BP <110/60, HR less than 60  -- Indication: For HTN    ferrous sulfate 325 mg (65 mg elemental iron) oral tablet  -- 1 tab(s) by mouth once a day  -- Indication: For Anemia    ascorbic acid 500 mg oral tablet  -- 1 tab(s) by mouth once a day  -- Indication: For nutrition

## 2024-01-07 NOTE — DISCHARGE NOTE OB - PATIENT PORTAL LINK FT
You can access the FollowMyHealth Patient Portal offered by [facility] by registering at the following website: [email or web address]. By joining [employer]’s FollowMyHealth portal, you will also be able to view your health information using other applications (apps) compatible with our system. You can access the FollowMyHealth Patient Portal offered by [facility] by registering at the following website: [email or web address]. By joining [facility]’s FollowMyHealth portal, you will also be able to view your health information using other applications (apps) compatible with our system.

## 2024-01-07 NOTE — OB PROVIDER DELIVERY SUMMARY - NSPROVIDERDELIVERYNOTE_OBGYN_ALL_OB_FT
Patient taken for PCS for breech presentation in  labor. Live male  delivered via breech extraction. Cord clamped and cut immediately and  handed to pediatricians. Cord gas obtained. Placenta delivered intact. Hysterotomy closed with vicryl. Fascia closed with vicryl. Subcutaneous layer reapproximated with 2-0 plain in 2 layers. Skin closed with vicryl. QBL 295cc. Patient taken for PCS for breech presentation in  labor. Live male  delivered via breech extraction. Cord clamped and cut immediately and  handed to pediatricians. Cord gas obtained. Placenta delivered intact. Hysterotomy closed with vicryl. Fascia closed with vicryl. Subcutaneous layer reapproximated with 2-0 plain in 2 layers. Skin closed with vicryl. MAKSIMBL 295cc.    Dictation #: [id number] Patient taken for PCS for breech presentation in  labor. Live male  delivered via breech extraction. Cord clamped and cut immediately and  handed to pediatricians. Cord gas obtained. Placenta delivered intact. Hysterotomy closed with vicryl. Fascia closed with vicryl. Subcutaneous layer reapproximated with 2-0 plain in 2 layers. Skin closed with vicryl. MAKSIMBL 295cc.    Dictation #: [id number]

## 2024-01-07 NOTE — OB PROVIDER DELIVERY SUMMARY - NSSELHIDDEN_OBGYN_ALL_OB_FT
[NS_DeliveryAttending1_OBGYN_ALL_OB_FT:[id number]],[NS_DeliveryAssist1_OBGYN_ALL_OB_FT:[id number]],[NS_DeliveryRN_OBGYN_ALL_OB_FT:[id number]] [NS_DeliveryAttending1_OBGYN_ALL_OB_FT:[id number]],[NS_DeliveryAssist1_OBGYN_ALL_OB_FT:[id number]],[NS_DeliveryRN_OBGYN_ALL_OB_FT:[id number]==]

## 2024-01-07 NOTE — DISCHARGE NOTE OB - NS AS DC FOLLOWUP INST YN
ANTICOAGULATION  MANAGEMENT PROGRAM    Eileen Donald is overdue for INR check.  Reminder call made.    Spoke with Marilee who declined to schedule INR at this time.  If calling back, please schedule INR check as soon as possible.    Radha Slater RN     Yes

## 2024-01-07 NOTE — DISCHARGE NOTE OB - CARE PROVIDER_API CALL
Esthela Hinojosa  Obstetrics and Gynecology  [address] [place] [address]  Phone: [phone]  Fax: [phone]  Follow Up Time:    Esthela Hinojosa  Obstetrics and Gynecology  [address] [place] [id number]  Phone: [phone]  Fax: [phone]  Follow Up Time:

## 2024-01-07 NOTE — PROGRESS NOTE ADULT - SUBJECTIVE AND OBJECTIVE BOX
R3 Antepartum Note, HD#2    Patient seen and examined at in PACU. Pt reports +FM, + LOF, and reports her contractions had spaced out to q1hr at 3/10 pain, however the last hour has had contractions that are 6/10 every 30m. Denies VB, HA, epigastric pain, blurred vision, CP, SOB, N/V, fevers, and chills.    Vital Signs Last 24 Hours  T(C): 37.0 (01-07-24 @ 05:05), Max: 37.3 (01-06-24 @ 20:39)  HR: 85 (01-07-24 @ 06:09) (72 - 92)  BP: 114/60 (01-07-24 @ 06:09) (102/51 - 136/77)  RR: 14 (01-07-24 @ 05:05) (14 - 16)  SpO2: 97% (01-06-24 @ 14:28) (93% - 99%)    CAPILLARY BLOOD GLUCOSE          Physical Exam:  General: NAD  Abdomen: Soft, non-tender, gravid  Ext: No pain or swelling    Continuous tracing reactive overnight    Labs:             12.7   11.04 )-----------( 195      ( 01-06 @ 15:10 )             36.3                   MEDICATIONS  (STANDING):  betamethasone Injectable 12 milliGRAM(s) IntraMuscular every 24 hours  chlorhexidine 2% Cloths 1 Application(s) Topical daily  clindamycin   Capsule 300 milliGRAM(s) Oral every 8 hours  clindamycin IVPB 900 milliGRAM(s) IV Intermittent every 8 hours  gentamicin   IVPB 400 milliGRAM(s) IV Intermittent daily  influenza   Vaccine 0.5 milliLiter(s) IntraMuscular once  lactated ringers. 1000 milliLiter(s) (125 mL/Hr) IV Continuous <Continuous>   R3 Antepartum Note, HD#2    Patient seen and examined at in PACU. Pt reports +FM, + LOF, and reports her contractions had spaced out to q1hr at 3/10 pain, however the last hour has had contractions that are 6/10 every 30m. Denies VB, HA, epigastric pain, blurred vision, CP, SOB, N/V, fevers, and chills.    Vital Signs Last 24 Hours  T(C): 37.0 (01-07-24 @ 05:05), Max: 37.3 (01-06-24 @ 20:39)  HR: 85 (01-07-24 @ 06:09) (72 - 92)  BP: 114/60 (01-07-24 @ 06:09) (102/51 - 136/77)  RR: 14 (01-07-24 @ 05:05) (14 - 16)  SpO2: 97% (01-06-24 @ 14:28) (93% - 99%)    CAPILLARY BLOOD GLUCOSE          Physical Exam:  General: NAD  Abdomen: Soft, non-tender, gravid  Ext: No pain or swelling  SSE: pooling positive, external os 1-2cm dilated on visual exam of cervix    Continuous tracing reactive overnight    Labs:             12.7   11.04 )-----------( 195      ( 01-06 @ 15:10 )             36.3                   MEDICATIONS  (STANDING):  betamethasone Injectable 12 milliGRAM(s) IntraMuscular every 24 hours  chlorhexidine 2% Cloths 1 Application(s) Topical daily  clindamycin   Capsule 300 milliGRAM(s) Oral every 8 hours  clindamycin IVPB 900 milliGRAM(s) IV Intermittent every 8 hours  gentamicin   IVPB 400 milliGRAM(s) IV Intermittent daily  influenza   Vaccine 0.5 milliLiter(s) IntraMuscular once  lactated ringers. 1000 milliLiter(s) (125 mL/Hr) IV Continuous <Continuous>

## 2024-01-07 NOTE — DISCHARGE NOTE OB - NS MD DC FALL RISK RISK
For information on Fall & Injury Prevention, visit: [email or web address]-[email or web address] OR  [email or web address]://[email or web address]/[email or web address]-[email or web address]-[email or web address]-avoid-injury OR  https://www.cdc.gov/steadi/patient.html For information on Fall & Injury Prevention, visit: https://www.[email or web address]/news/fall-prevention-protects-and-maintains-health-[email or web address]-mobility OR  https://www.[facility]/news/fall-prevention-tips-to-avoid-injury OR  https://www.cdc.gov/steadi/patient.html

## 2024-01-07 NOTE — OB RN INTRAOPERATIVE NOTE - NSSELHIDDEN_OBGYN_ALL_OB_FT
[NS_DeliveryAttending1_OBGYN_ALL_OB_FT:[id number]],[NS_DeliveryAssist1_OBGYN_ALL_OB_FT:[id number]],[NS_DeliveryRN_OBGYN_ALL_OB_FT:[id number]==] [NS_DeliveryAttending1_OBGYN_ALL_OB_FT:[id number]],[NS_DeliveryAssist1_OBGYN_ALL_OB_FT:[id number]=],[NS_DeliveryRN_OBGYN_ALL_OB_FT:[id number]==]

## 2024-01-07 NOTE — DISCHARGE NOTE OB - ADDITIONAL INSTRUCTIONS
Follow up in the office in 2 weeks for postop visit Follow up in the office in 3-4 days for blood pressure check

## 2024-01-07 NOTE — OB RN DELIVERY SUMMARY - NSSELHIDDEN_OBGYN_ALL_OB_FT
[NS_DeliveryAttending1_OBGYN_ALL_OB_FT:[id number]],[NS_DeliveryAssist1_OBGYN_ALL_OB_FT:[id number]],[NS_DeliveryRN_OBGYN_ALL_OB_FT:[id number]] [NS_DeliveryAttending1_OBGYN_ALL_OB_FT:[id number]],[NS_DeliveryAssist1_OBGYN_ALL_OB_FT:[id number]=],[NS_DeliveryRN_OBGYN_ALL_OB_FT:[id number]==]

## 2024-01-07 NOTE — CHART NOTE - NSCHARTNOTEFT_GEN_A_CORE
Patient complaining of worsening contractions pain. Examined at bedside by PGY3 - made progress to 4cm. Breech presenting. Patient counseled on need to proceed with PCS now as she is breech in labor. Reviewed risks of  section including but not limited to bleeding, infection, injury to surrounding organs, DVT/PE, poor wound healing, hysterectomy. Patient understands and agrees for  delivery. Consents signed. Anesthesia/nursing made aware. Plan to proceed to OR for PCS.

## 2024-01-07 NOTE — DISCHARGE NOTE OB - CARE PLAN
1 Principal Discharge DX:	 delivery delivered  Assessment and plan of treatment:	routine postoperative care

## 2024-01-07 NOTE — OB NEONATOLOGY/PEDIATRICIAN DELIVERY SUMMARY - NSPEDSNEONOTESA_OBGYN_ALL_OB_FT
Baby is a 33.4 wk ___GA male born to a 33 y/o  mother via C/S. PEDS called to delivery in OR3 for   and breech. Maternal history significant for mitral valve regurgitation and s/p LEEP in 2018. Prenatal history significant for BPP of 8/8 on , breech presentation, and . Maternal blood type O+. PNL negative, non-reactive, and immune. GBS negative on _____. _ROM at ____ on _____, clear/bloody/mec fluids. Baby emerged pale/blue and obtunded without spontaneous cry. Cord was clamped immediately and baby was brought to warmer. Warmed, dried, stimulated, deep suctioned but had apnea without cry or improved tone, so PPV started at 1MOL for 30secs with subsequent cry so was transitioned to CPAP at 1-2MOL. CPAP administered for total of ______ mins (max settings ________ ). Apgars 2/6/_____ . Unable to calculate EOS due to young gestation age. Mom plans to breastfeed/bottlefeed and consents/declines hepB. Circ requested.   BW: Baby is a 33.4 wk ___GA male born to a 35 y/o  mother via C/S. PEDS called to delivery in OR3 for   and breech. Maternal history significant for mitral valve regurgitation and s/p LEEP in 2018. Prenatal history significant for BPP of 8/8 on , breech presentation, and . Maternal blood type O+. PNL negative, non-reactive, and immune. GBS negative on _____. _ROM at ____ on _____, clear/bloody/mec fluids. Baby emerged pale/blue and obtunded without spontaneous cry. Cord was clamped immediately and baby was brought to warmer. Warmed, dried, stimulated, deep suctioned but had apnea without cry or improved tone, so PPV started at 1MOL for 30secs with subsequent cry so was transitioned to CPAP at 1-2MOL. CPAP administered for total of ______ mins (max settings ________ ). Apgars 2/6/_____ . Unable to calculate EOS due to young gestation age. Mom plans to breastfeed/bottlefeed and consents/declines hepB. Circ requested.   BW: Baby is a 33.4 wk ___GA male born to a 33 y/o  mother via C/S. PEDS called to delivery in OR3 for   and breech. Maternal history significant for mitral valve regurgitation and s/p LEEP in 2018. Prenatal history significant for BPP of 8/8 on , breech presentation, and . Maternal blood type O+. PNL negative, non-reactive, and immune. GBS negative on _____. _ROM at ____ on _____, clear/bloody/mec fluids. Baby emerged pale/blue and obtunded without spontaneous cry. Cord was clamped immediately and baby was brought to warmer. Warmed, dried, stimulated, deep suctioned but had apnea without cry or improved tone, so intermittent PPV given at 30seconds of life with subsequent cry so was transitioned to CPAP at 1MOL. CPAP administered for total of ______ mins (max settings ________ ). Apgars 2/7/9. Unable to calculate EOS due to young gestation age. Mom plans to breastfeed/bottlefeed and consents/declines hepB. Circ requested.   BW: Baby is a 33.4 wk AGA male born to a 35 y/o  mother via C/S. PEDS called to delivery in OR3 for   and breech. Maternal history significant for mitral valve regurgitation and s/p LEEP in 2018. Prenatal history significant for BPP of 8/8 on 24, breech presentation, and . Maternal blood type O+. PNL negative, non-reactive, and immune. GBS unknown, mom s/p clindamycin x 6, gentamycin x 1, and azithromycin x 1. Mom also s/p betamethasone x1 on 24. SROM at 1140 on 24, clear fluids. Baby emerged pale/blue and obtunded without spontaneous cry. Cord was clamped immediately and baby was brought to warmer. Warmed, dried, stimulated, deep suctioned but had apnea without cry or improved tone, so intermittent PPV given at 30seconds of life with subsequent cry so was transitioned to CPAP at 1MOL (max settings 5/ 50%, weaned to 30% based on pulse oximetry). Apgars 2/7/9. Unable to calculate EOS due to young gestation age. Mom plans to breastfeed and declines hepB. Circ declined.   BW: 2440 Baby is a 33.4 wk AGA male born to a 33 y/o  mother via C/S. PEDS called to delivery in OR3 for   and breech. Maternal history significant for mitral valve regurgitation and s/p LEEP in 2018. Prenatal history significant for BPP of 8/8 on 24, breech presentation, and . Maternal blood type O+. PNL negative, non-reactive, and immune. GBS unknown, mom s/p clindamycin x 6, gentamycin x 1, and azithromycin x 1. Mom also s/p betamethasone x1 on 24. PPROM spontaneously at 1140 on 24, clear fluids. Baby emerged pale/blue and obtunded without spontaneous cry. Cord was clamped immediately and baby was brought to warmer. Warmed, dried, stimulated, deep suctioned but had apnea without cry or improved tone, so intermittent PPV given at 30seconds of life with subsequent cry so was transitioned to CPAP at 1MOL (max settings 5/ 50%, weaned to 30% based on pulse oximetry). Apgars 2/7/9. Unable to calculate EOS due to young gestation age. Mom plans to breastfeed and declines hepB. Circ declined.   BW: 2440 Baby is a 33.4 wk AGA male born to a 35 y/o  mother via C/S. PEDS called to delivery in OR3 for   and breech. Maternal history significant for mitral valve regurgitation and s/p LEEP in 2018. Prenatal history significant for BPP of 8/8 on 24, breech presentation, and . Maternal blood type O+. PNL negative, non-reactive, and immune. GBS unknown, mom s/p clindamycin x 6, gentamycin x 1, and azithromycin x 1. Mom also s/p betamethasone x1 on 24. PPROM spontaneously at 1140 on 24, clear fluids. Baby emerged pale/blue and obtunded without spontaneous cry. Cord was clamped immediately and baby was brought to warmer. Warmed, dried, stimulated, deep suctioned but had apnea without cry or improved tone, so intermittent PPV given at 30seconds of life with subsequent cry so was transitioned to CPAP at 1MOL (max settings 5/ 50%, weaned to 30% based on pulse oximetry). Apgars 2/7/9. Unable to calculate EOS due to young gestation age. Mom plans to breastfeed and declines hepB. Circ declined.   BW: 2440 Baby is a 33.4 wk AGA male born to a 33 y/o  mother via C/S. PEDS called to delivery in OR3 for   and breech. Maternal history significant for mitral valve regurgitation and s/p LEEP in 2018. Prenatal history significant for BPP of 8/8 on 24, breech presentation, and . Maternal blood type O+. PNL negative, non-reactive, and immune. GBS unknown, mom s/p clindamycin x 6, gentamycin x 1, and azithromycin x 1. Mom also s/p betamethasone x1 on 24. PPROM at 1140 on 24, clear fluids. Baby emerged pale/blue and obtunded without spontaneous cry. Cord was clamped immediately and baby was brought to warmer. Warmed, dried, stimulated, deep suctioned but apneic and poor tone. Intermittent PPV  20/5 100% FIO2 at 30seconds of life to 1 MOL. Transitioned to CPAP at 1MOL CPAP 5,  50%. Increased to CPAP 6 and weaned to 30% based on pulse oximetry at ~ 5 MOL for tachypnea. Apgars 2/7/9. EOS N/A. Mom plans to breastfeed and declines hepB. Circ declined.   BW: 2440 This is a 33.4 wk AGA male born to a 35 y/o  mother via unscheduled C/S. PEDS called to delivery in OR 3 for  infant with breech presentation and C/S.  Maternal history significant for mitral valve regurgitation followed by Cardiology,  LEERAMIRO in 2018, and TOP with D+C. Prenatal history significant for BPP of 8/8 on 24 and breech. Maternal blood type O+. PNL negative, non-reactive, and immune. GBS unknown (sent). Mother s/p clindamycin x 3 doses, gentamycin x 1, azithromycin x 1, and Betamethasone x1 on 24. PPROM on 24 at 11:40 with  clear fluids. Baby emerged with poor color/cyanotic, poor tone, and without spontaneous cry. Cord was clamped immediately and baby was brought to warmer. Warmed, dried, stimulated, suctioned and deep suctioned with secretions obtained. Heart Rate >60 to 100. PPV initiated at 20/5 F102 100%.  Code 100 called. Intermittent PPV 20/5, 100% FIO2 from 30seconds of life to 1 MOL for Apnea.  Heart Rate Greater than 100. Transitioned to CPAP 5, 50% at ~ 1 MOL. CPAP titrated to 6 and F102 weaned to 30% at ~ 5 MOL based on pulse ox parameters with nasal flaring and grunting. Apgars 2/7/9. EOS N/A. Mom plans to breastfeed and declines hepB. Circ declined.   BW: 2440g. Temperature Prior to Transfer 37.1. Transferred to NICU on BCPAP6, 25 %.   Nursery resident, Sirena Rowe NICU PA , Kezia Kinsey MD NICU Fellow, and Dr Marielle Barrett NICU Attending present at delivery.

## 2024-01-07 NOTE — OB NEONATOLOGY/PEDIATRICIAN DELIVERY SUMMARY - NSPHYSICALEXAMDETAILSA_OBGYN_ALL_OB_FT
+micrognathia, flat faces, swelling of b/l hands and feet, hygromatous-like edema of anterior neck, asymmetrical scalp +micrognathia, flat faces, swelling of b/l hands and dorsal aspect of feet, hygromatous-like edema of anterior neck, asymmetrical scalp micrognathia, recessed chin,  flat faces, Prominence of b/l hands. Edematous dorsal aspect of feet Rt greater than Lt with asymmetry of lower legs with edematous Right lower leg, redundant-thickened skin of anterior Rt neck, Asymmetry of Scalp with overriding sutures Rt and posterior displacement of Anterior fontanel, sacral dimple

## 2024-01-07 NOTE — OB NEONATOLOGY/PEDIATRICIAN DELIVERY SUMMARY - NS_RESUSCITPROC_OBGYN_ALL_OB
Nasal Suction/CPAP/Tactile Stimulation/Pulse Oximetry/FIO2 Nasal Suction/CPAP/Tactile Stimulation/T-Piece Resuscitator/Pulse Oximetry/FIO2

## 2024-01-08 PROBLEM — Z00.00 ENCOUNTER FOR PREVENTIVE HEALTH EXAMINATION: Status: ACTIVE | Noted: 2024-01-08

## 2024-01-08 LAB
BASOPHILS # BLD AUTO: 0.02 K/UL — SIGNIFICANT CHANGE UP (ref 0–0.2)
BASOPHILS # BLD AUTO: 0.02 K/UL — SIGNIFICANT CHANGE UP (ref 0–0.2)
BASOPHILS NFR BLD AUTO: 0.2 % — SIGNIFICANT CHANGE UP (ref 0–2)
BASOPHILS NFR BLD AUTO: 0.2 % — SIGNIFICANT CHANGE UP (ref 0–2)
CULTURE RESULTS: SIGNIFICANT CHANGE UP
EOSINOPHIL # BLD AUTO: 0 K/UL — SIGNIFICANT CHANGE UP (ref 0–0.5)
EOSINOPHIL # BLD AUTO: 0 K/UL — SIGNIFICANT CHANGE UP (ref 0–0.5)
EOSINOPHIL NFR BLD AUTO: 0 % — SIGNIFICANT CHANGE UP (ref 0–6)
EOSINOPHIL NFR BLD AUTO: 0 % — SIGNIFICANT CHANGE UP (ref 0–6)
HCT VFR BLD CALC: 31.2 % — LOW (ref 34.5–45)
HCT VFR BLD CALC: 31.2 % — LOW (ref 34.5–45)
HGB BLD-MCNC: 10.8 G/DL — LOW (ref 11.5–15.5)
HGB BLD-MCNC: 10.8 G/DL — LOW (ref 11.5–15.5)
IANC: 8.76 K/UL — HIGH (ref 1.8–7.4)
IANC: 8.76 K/UL — HIGH (ref 1.8–7.4)
IMM GRANULOCYTES NFR BLD AUTO: 0.4 % — SIGNIFICANT CHANGE UP (ref 0–0.9)
IMM GRANULOCYTES NFR BLD AUTO: 0.4 % — SIGNIFICANT CHANGE UP (ref 0–0.9)
LYMPHOCYTES # BLD AUTO: 1.23 K/UL — SIGNIFICANT CHANGE UP (ref 1–3.3)
LYMPHOCYTES # BLD AUTO: 1.23 K/UL — SIGNIFICANT CHANGE UP (ref 1–3.3)
LYMPHOCYTES # BLD AUTO: 11.1 % — LOW (ref 13–44)
LYMPHOCYTES # BLD AUTO: 11.1 % — LOW (ref 13–44)
MCHC RBC-ENTMCNC: 30 PG — SIGNIFICANT CHANGE UP (ref 27–34)
MCHC RBC-ENTMCNC: 30 PG — SIGNIFICANT CHANGE UP (ref 27–34)
MCHC RBC-ENTMCNC: 34.6 GM/DL — SIGNIFICANT CHANGE UP (ref 32–36)
MCHC RBC-ENTMCNC: 34.6 GM/DL — SIGNIFICANT CHANGE UP (ref 32–36)
MCV RBC AUTO: 86.7 FL — SIGNIFICANT CHANGE UP (ref 80–100)
MCV RBC AUTO: 86.7 FL — SIGNIFICANT CHANGE UP (ref 80–100)
MONOCYTES # BLD AUTO: 1 K/UL — HIGH (ref 0–0.9)
MONOCYTES # BLD AUTO: 1 K/UL — HIGH (ref 0–0.9)
MONOCYTES NFR BLD AUTO: 9 % — SIGNIFICANT CHANGE UP (ref 2–14)
MONOCYTES NFR BLD AUTO: 9 % — SIGNIFICANT CHANGE UP (ref 2–14)
NEUTROPHILS # BLD AUTO: 8.76 K/UL — HIGH (ref 1.8–7.4)
NEUTROPHILS # BLD AUTO: 8.76 K/UL — HIGH (ref 1.8–7.4)
NEUTROPHILS NFR BLD AUTO: 79.3 % — HIGH (ref 43–77)
NEUTROPHILS NFR BLD AUTO: 79.3 % — HIGH (ref 43–77)
NRBC # BLD: 0 /100 WBCS — SIGNIFICANT CHANGE UP (ref 0–0)
NRBC # BLD: 0 /100 WBCS — SIGNIFICANT CHANGE UP (ref 0–0)
NRBC # FLD: 0 K/UL — SIGNIFICANT CHANGE UP (ref 0–0)
NRBC # FLD: 0 K/UL — SIGNIFICANT CHANGE UP (ref 0–0)
PLATELET # BLD AUTO: 210 K/UL — SIGNIFICANT CHANGE UP (ref 150–400)
PLATELET # BLD AUTO: 210 K/UL — SIGNIFICANT CHANGE UP (ref 150–400)
RBC # BLD: 3.6 M/UL — LOW (ref 3.8–5.2)
RBC # BLD: 3.6 M/UL — LOW (ref 3.8–5.2)
RBC # FLD: 13.2 % — SIGNIFICANT CHANGE UP (ref 10.3–14.5)
RBC # FLD: 13.2 % — SIGNIFICANT CHANGE UP (ref 10.3–14.5)
SPECIMEN SOURCE: SIGNIFICANT CHANGE UP
WBC # BLD: 11.05 K/UL — HIGH (ref 3.8–10.5)
WBC # BLD: 11.05 K/UL — HIGH (ref 3.8–10.5)
WBC # FLD AUTO: 11.05 K/UL — HIGH (ref 3.8–10.5)
WBC # FLD AUTO: 11.05 K/UL — HIGH (ref 3.8–10.5)

## 2024-01-08 RX ORDER — ASCORBIC ACID 60 MG
500 TABLET,CHEWABLE ORAL DAILY
Refills: 0 | Status: DISCONTINUED | OUTPATIENT
Start: 2024-01-08 | End: 2024-01-12

## 2024-01-08 RX ORDER — FERROUS SULFATE 325(65) MG
325 TABLET ORAL DAILY
Refills: 0 | Status: DISCONTINUED | OUTPATIENT
Start: 2024-01-08 | End: 2024-01-12

## 2024-01-08 RX ORDER — IBUPROFEN 200 MG
600 TABLET ORAL EVERY 6 HOURS
Refills: 0 | Status: DISCONTINUED | OUTPATIENT
Start: 2024-01-08 | End: 2024-01-12

## 2024-01-08 RX ADMIN — Medication 600 MILLIGRAM(S): at 22:05

## 2024-01-08 RX ADMIN — Medication 30 MILLIGRAM(S): at 16:33

## 2024-01-08 RX ADMIN — Medication 325 MILLIGRAM(S): at 12:20

## 2024-01-08 RX ADMIN — Medication 30 MILLIGRAM(S): at 08:35

## 2024-01-08 RX ADMIN — Medication 975 MILLIGRAM(S): at 17:33

## 2024-01-08 RX ADMIN — Medication 30 MILLIGRAM(S): at 02:06

## 2024-01-08 RX ADMIN — Medication 975 MILLIGRAM(S): at 08:04

## 2024-01-08 RX ADMIN — Medication 600 MILLIGRAM(S): at 23:05

## 2024-01-08 RX ADMIN — Medication 30 MILLIGRAM(S): at 16:50

## 2024-01-08 RX ADMIN — Medication 975 MILLIGRAM(S): at 23:05

## 2024-01-08 RX ADMIN — MAGNESIUM HYDROXIDE 30 MILLILITER(S): 400 TABLET, CHEWABLE ORAL at 22:07

## 2024-01-08 RX ADMIN — Medication 975 MILLIGRAM(S): at 09:04

## 2024-01-08 RX ADMIN — Medication 30 MILLIGRAM(S): at 03:06

## 2024-01-08 RX ADMIN — HEPARIN SODIUM 5000 UNIT(S): 5000 INJECTION INTRAVENOUS; SUBCUTANEOUS at 12:15

## 2024-01-08 RX ADMIN — Medication 500 MILLIGRAM(S): at 12:15

## 2024-01-08 RX ADMIN — Medication 30 MILLIGRAM(S): at 08:05

## 2024-01-08 RX ADMIN — Medication 975 MILLIGRAM(S): at 16:33

## 2024-01-08 RX ADMIN — Medication 975 MILLIGRAM(S): at 22:05

## 2024-01-09 RX ORDER — ACETAMINOPHEN 500 MG
3 TABLET ORAL
Qty: 0 | Refills: 0 | DISCHARGE
Start: 2024-01-09

## 2024-01-09 RX ORDER — IBUPROFEN 200 MG
1 TABLET ORAL
Qty: 0 | Refills: 0 | DISCHARGE
Start: 2024-01-09

## 2024-01-09 RX ORDER — FERROUS SULFATE 325(65) MG
1 TABLET ORAL
Qty: 0 | Refills: 0 | DISCHARGE
Start: 2024-01-09

## 2024-01-09 RX ORDER — ASCORBIC ACID 60 MG
1 TABLET,CHEWABLE ORAL
Qty: 0 | Refills: 0 | DISCHARGE
Start: 2024-01-09

## 2024-01-09 RX ADMIN — HEPARIN SODIUM 5000 UNIT(S): 5000 INJECTION INTRAVENOUS; SUBCUTANEOUS at 06:16

## 2024-01-09 RX ADMIN — Medication 600 MILLIGRAM(S): at 06:16

## 2024-01-09 RX ADMIN — Medication 975 MILLIGRAM(S): at 06:15

## 2024-01-09 RX ADMIN — Medication 600 MILLIGRAM(S): at 12:38

## 2024-01-09 RX ADMIN — HEPARIN SODIUM 5000 UNIT(S): 5000 INJECTION INTRAVENOUS; SUBCUTANEOUS at 18:36

## 2024-01-09 RX ADMIN — Medication 325 MILLIGRAM(S): at 12:26

## 2024-01-09 RX ADMIN — Medication 975 MILLIGRAM(S): at 12:33

## 2024-01-09 RX ADMIN — Medication 975 MILLIGRAM(S): at 18:41

## 2024-01-09 RX ADMIN — Medication 600 MILLIGRAM(S): at 18:36

## 2024-01-09 RX ADMIN — Medication 500 MILLIGRAM(S): at 12:27

## 2024-01-09 NOTE — PROGRESS NOTE ADULT - SUBJECTIVE AND OBJECTIVE BOX
Post-Operative Note, C/S  She is a  33y woman who is now post-operative day: 2  Admitted for ROM.     Subjective:  The patient feels well.  She is ambulating.   She is tolerating regular diet.  She denies nausea and vomiting; denies fever.  She has passed gas.   She is voiding.  Her pain is controlled; incisional pain is appropriate.  She reports normal postpartum bleeding.  She is pumping and bottle feeding while baby in NICU.    Physical exam:    Vital Signs Last 24 Hrs  T(C): 37.1 (09 Jan 2024 06:26), Max: 37.1 (08 Jan 2024 18:07)  T(F): 98.7 (09 Jan 2024 06:26), Max: 98.8 (08 Jan 2024 18:07)  HR: 92 (09 Jan 2024 06:26) (78 - 92)  BP: 134/58 (09 Jan 2024 06:26) (123/78 - 134/58)  BP(mean): --  RR: 17 (09 Jan 2024 06:26) (16 - 17)  SpO2: 100% (09 Jan 2024 06:26) (96% - 100%)    Parameters below as of 09 Jan 2024 06:26  Patient On (Oxygen Delivery Method): room air        Gen: NAD  Breast: Soft, nontender, not engorged.  Abdomen: Soft, nontender, no distension , firm uterine fundus at umbilicus.  Incision: C/D/I.  Pelvic: Normal lochia noted  Ext: No calf tenderness    LABS:                        10.8   11.05 )-----------( 210      ( 08 Jan 2024 05:20 )             31.2       Rubella status:     Allergies    penicillin (Other)    Intolerances      MEDICATIONS  (STANDING):  acetaminophen     Tablet .. 975 milliGRAM(s) Oral <User Schedule>  ascorbic acid 500 milliGRAM(s) Oral daily  betamethasone Injectable 12 milliGRAM(s) IntraMuscular every 24 hours  chlorhexidine 2% Cloths 1 Application(s) Topical daily  clindamycin   Capsule 300 milliGRAM(s) Oral every 8 hours  diphtheria/tetanus/pertussis (acellular) Vaccine (Adacel) 0.5 milliLiter(s) IntraMuscular once  ferrous    sulfate 325 milliGRAM(s) Oral daily  heparin   Injectable 5000 Unit(s) SubCutaneous every 12 hours  ibuprofen  Tablet. 600 milliGRAM(s) Oral every 6 hours  lactated ringers. 1000 milliLiter(s) (125 mL/Hr) IV Continuous <Continuous>  lactated ringers. 1000 milliLiter(s) (125 mL/Hr) IV Continuous <Continuous>  morphine PF Spinal 0.1 milliGRAM(s) IntraThecal. once  oxytocin Infusion 333.333 milliUNIT(s)/Min (1000 mL/Hr) IV Continuous <Continuous>    MEDICATIONS  (PRN):  dexAMETHasone  Injectable 4 milliGRAM(s) IV Push every 6 hours PRN Nausea  diphenhydrAMINE 25 milliGRAM(s) Oral every 6 hours PRN Pruritus  lanolin Ointment 1 Application(s) Topical every 6 hours PRN Sore Nipples  magnesium hydroxide Suspension 30 milliLiter(s) Oral two times a day PRN Constipation  naloxone Injectable 0.1 milliGRAM(s) IV Push every 3 minutes PRN For ANY of the following changes in patient status:  A. Breaths Per Minute LESS THAN 10, B. Oxygen saturation LESS THAN 90%, C. Sedation score of 6 for Stop After: 4 Times  ondansetron Injectable 4 milliGRAM(s) IV Push every 6 hours PRN Nausea  oxyCODONE    IR 5 milliGRAM(s) Oral every 3 hours PRN Moderate to Severe Pain (4-10)  oxyCODONE    IR 5 milliGRAM(s) Oral once PRN Moderate to Severe Pain (4-10)  simethicone 80 milliGRAM(s) Chew every 4 hours PRN Gas        Assessment and Plan  POD #2 s/p C/S.  Doing well.  Encourage ambulation.  Incisional care and PO instructions reviewed.  PP education materials provided.   CPC.  D/C planning. Pt plans to stay until 1/11.  Plan reviewed with Dr. Francois.

## 2024-01-10 RX ORDER — HYDROCORTISONE 1 %
1 OINTMENT (GRAM) TOPICAL
Refills: 0 | Status: DISCONTINUED | OUTPATIENT
Start: 2024-01-10 | End: 2024-01-12

## 2024-01-10 RX ADMIN — Medication 975 MILLIGRAM(S): at 15:07

## 2024-01-10 RX ADMIN — Medication 600 MILLIGRAM(S): at 01:56

## 2024-01-10 RX ADMIN — Medication 975 MILLIGRAM(S): at 01:49

## 2024-01-10 RX ADMIN — Medication 975 MILLIGRAM(S): at 21:15

## 2024-01-10 RX ADMIN — Medication 975 MILLIGRAM(S): at 20:15

## 2024-01-10 RX ADMIN — Medication 600 MILLIGRAM(S): at 01:49

## 2024-01-10 RX ADMIN — Medication 600 MILLIGRAM(S): at 00:23

## 2024-01-10 RX ADMIN — Medication 975 MILLIGRAM(S): at 07:06

## 2024-01-10 RX ADMIN — Medication 600 MILLIGRAM(S): at 20:15

## 2024-01-10 RX ADMIN — Medication 600 MILLIGRAM(S): at 21:15

## 2024-01-10 RX ADMIN — Medication 600 MILLIGRAM(S): at 06:13

## 2024-01-10 RX ADMIN — HEPARIN SODIUM 5000 UNIT(S): 5000 INJECTION INTRAVENOUS; SUBCUTANEOUS at 06:13

## 2024-01-10 RX ADMIN — Medication 975 MILLIGRAM(S): at 06:12

## 2024-01-10 RX ADMIN — Medication 975 MILLIGRAM(S): at 00:23

## 2024-01-10 RX ADMIN — Medication 600 MILLIGRAM(S): at 07:00

## 2024-01-10 RX ADMIN — Medication 600 MILLIGRAM(S): at 15:07

## 2024-01-11 LAB
ALBUMIN SERPL ELPH-MCNC: 3 G/DL — LOW (ref 3.3–5)
ALBUMIN SERPL ELPH-MCNC: 3 G/DL — LOW (ref 3.3–5)
ALP SERPL-CCNC: 78 U/L — SIGNIFICANT CHANGE UP (ref 40–120)
ALP SERPL-CCNC: 78 U/L — SIGNIFICANT CHANGE UP (ref 40–120)
ALT FLD-CCNC: 18 U/L — SIGNIFICANT CHANGE UP (ref 4–33)
ALT FLD-CCNC: 18 U/L — SIGNIFICANT CHANGE UP (ref 4–33)
ANION GAP SERPL CALC-SCNC: 12 MMOL/L — SIGNIFICANT CHANGE UP (ref 7–14)
ANION GAP SERPL CALC-SCNC: 12 MMOL/L — SIGNIFICANT CHANGE UP (ref 7–14)
APTT BLD: 28.7 SEC — SIGNIFICANT CHANGE UP (ref 24.5–35.6)
APTT BLD: 28.7 SEC — SIGNIFICANT CHANGE UP (ref 24.5–35.6)
AST SERPL-CCNC: 21 U/L — SIGNIFICANT CHANGE UP (ref 4–32)
AST SERPL-CCNC: 21 U/L — SIGNIFICANT CHANGE UP (ref 4–32)
BASOPHILS # BLD AUTO: 0.02 K/UL — SIGNIFICANT CHANGE UP (ref 0–0.2)
BASOPHILS # BLD AUTO: 0.02 K/UL — SIGNIFICANT CHANGE UP (ref 0–0.2)
BASOPHILS NFR BLD AUTO: 0.3 % — SIGNIFICANT CHANGE UP (ref 0–2)
BASOPHILS NFR BLD AUTO: 0.3 % — SIGNIFICANT CHANGE UP (ref 0–2)
BILIRUB SERPL-MCNC: <0.2 MG/DL — SIGNIFICANT CHANGE UP (ref 0.2–1.2)
BILIRUB SERPL-MCNC: <0.2 MG/DL — SIGNIFICANT CHANGE UP (ref 0.2–1.2)
BUN SERPL-MCNC: 16 MG/DL — SIGNIFICANT CHANGE UP (ref 7–23)
BUN SERPL-MCNC: 16 MG/DL — SIGNIFICANT CHANGE UP (ref 7–23)
CALCIUM SERPL-MCNC: 9.5 MG/DL — SIGNIFICANT CHANGE UP (ref 8.4–10.5)
CALCIUM SERPL-MCNC: 9.5 MG/DL — SIGNIFICANT CHANGE UP (ref 8.4–10.5)
CHLORIDE SERPL-SCNC: 104 MMOL/L — SIGNIFICANT CHANGE UP (ref 98–107)
CHLORIDE SERPL-SCNC: 104 MMOL/L — SIGNIFICANT CHANGE UP (ref 98–107)
CO2 SERPL-SCNC: 22 MMOL/L — SIGNIFICANT CHANGE UP (ref 22–31)
CO2 SERPL-SCNC: 22 MMOL/L — SIGNIFICANT CHANGE UP (ref 22–31)
CREAT SERPL-MCNC: 0.86 MG/DL — SIGNIFICANT CHANGE UP (ref 0.5–1.3)
CREAT SERPL-MCNC: 0.86 MG/DL — SIGNIFICANT CHANGE UP (ref 0.5–1.3)
EGFR: 91 ML/MIN/1.73M2 — SIGNIFICANT CHANGE UP
EGFR: 91 ML/MIN/1.73M2 — SIGNIFICANT CHANGE UP
EOSINOPHIL # BLD AUTO: 0.21 K/UL — SIGNIFICANT CHANGE UP (ref 0–0.5)
EOSINOPHIL # BLD AUTO: 0.21 K/UL — SIGNIFICANT CHANGE UP (ref 0–0.5)
EOSINOPHIL NFR BLD AUTO: 2.9 % — SIGNIFICANT CHANGE UP (ref 0–6)
EOSINOPHIL NFR BLD AUTO: 2.9 % — SIGNIFICANT CHANGE UP (ref 0–6)
FIBRINOGEN PPP-MCNC: 659 MG/DL — HIGH (ref 200–465)
FIBRINOGEN PPP-MCNC: 659 MG/DL — HIGH (ref 200–465)
GLUCOSE SERPL-MCNC: 101 MG/DL — HIGH (ref 70–99)
GLUCOSE SERPL-MCNC: 101 MG/DL — HIGH (ref 70–99)
HCT VFR BLD CALC: 29.2 % — LOW (ref 34.5–45)
HCT VFR BLD CALC: 29.2 % — LOW (ref 34.5–45)
HGB BLD-MCNC: 10 G/DL — LOW (ref 11.5–15.5)
HGB BLD-MCNC: 10 G/DL — LOW (ref 11.5–15.5)
IANC: 5.15 K/UL — SIGNIFICANT CHANGE UP (ref 1.8–7.4)
IANC: 5.15 K/UL — SIGNIFICANT CHANGE UP (ref 1.8–7.4)
IMM GRANULOCYTES NFR BLD AUTO: 0.4 % — SIGNIFICANT CHANGE UP (ref 0–0.9)
IMM GRANULOCYTES NFR BLD AUTO: 0.4 % — SIGNIFICANT CHANGE UP (ref 0–0.9)
INR BLD: <0.9 RATIO — LOW (ref 0.85–1.18)
INR BLD: <0.9 RATIO — LOW (ref 0.85–1.18)
LDH SERPL L TO P-CCNC: 169 U/L — SIGNIFICANT CHANGE UP (ref 135–225)
LDH SERPL L TO P-CCNC: 169 U/L — SIGNIFICANT CHANGE UP (ref 135–225)
LYMPHOCYTES # BLD AUTO: 1.54 K/UL — SIGNIFICANT CHANGE UP (ref 1–3.3)
LYMPHOCYTES # BLD AUTO: 1.54 K/UL — SIGNIFICANT CHANGE UP (ref 1–3.3)
LYMPHOCYTES # BLD AUTO: 21.1 % — SIGNIFICANT CHANGE UP (ref 13–44)
LYMPHOCYTES # BLD AUTO: 21.1 % — SIGNIFICANT CHANGE UP (ref 13–44)
MCHC RBC-ENTMCNC: 30 PG — SIGNIFICANT CHANGE UP (ref 27–34)
MCHC RBC-ENTMCNC: 30 PG — SIGNIFICANT CHANGE UP (ref 27–34)
MCHC RBC-ENTMCNC: 34.2 GM/DL — SIGNIFICANT CHANGE UP (ref 32–36)
MCHC RBC-ENTMCNC: 34.2 GM/DL — SIGNIFICANT CHANGE UP (ref 32–36)
MCV RBC AUTO: 87.7 FL — SIGNIFICANT CHANGE UP (ref 80–100)
MCV RBC AUTO: 87.7 FL — SIGNIFICANT CHANGE UP (ref 80–100)
MONOCYTES # BLD AUTO: 0.34 K/UL — SIGNIFICANT CHANGE UP (ref 0–0.9)
MONOCYTES # BLD AUTO: 0.34 K/UL — SIGNIFICANT CHANGE UP (ref 0–0.9)
MONOCYTES NFR BLD AUTO: 4.7 % — SIGNIFICANT CHANGE UP (ref 2–14)
MONOCYTES NFR BLD AUTO: 4.7 % — SIGNIFICANT CHANGE UP (ref 2–14)
NEUTROPHILS # BLD AUTO: 5.15 K/UL — SIGNIFICANT CHANGE UP (ref 1.8–7.4)
NEUTROPHILS # BLD AUTO: 5.15 K/UL — SIGNIFICANT CHANGE UP (ref 1.8–7.4)
NEUTROPHILS NFR BLD AUTO: 70.6 % — SIGNIFICANT CHANGE UP (ref 43–77)
NEUTROPHILS NFR BLD AUTO: 70.6 % — SIGNIFICANT CHANGE UP (ref 43–77)
NRBC # BLD: 0 /100 WBCS — SIGNIFICANT CHANGE UP (ref 0–0)
NRBC # BLD: 0 /100 WBCS — SIGNIFICANT CHANGE UP (ref 0–0)
NRBC # FLD: 0 K/UL — SIGNIFICANT CHANGE UP (ref 0–0)
NRBC # FLD: 0 K/UL — SIGNIFICANT CHANGE UP (ref 0–0)
PLATELET # BLD AUTO: 267 K/UL — SIGNIFICANT CHANGE UP (ref 150–400)
PLATELET # BLD AUTO: 267 K/UL — SIGNIFICANT CHANGE UP (ref 150–400)
POTASSIUM SERPL-MCNC: 4.4 MMOL/L — SIGNIFICANT CHANGE UP (ref 3.5–5.3)
POTASSIUM SERPL-MCNC: 4.4 MMOL/L — SIGNIFICANT CHANGE UP (ref 3.5–5.3)
POTASSIUM SERPL-SCNC: 4.4 MMOL/L — SIGNIFICANT CHANGE UP (ref 3.5–5.3)
POTASSIUM SERPL-SCNC: 4.4 MMOL/L — SIGNIFICANT CHANGE UP (ref 3.5–5.3)
PROT SERPL-MCNC: 6.1 G/DL — SIGNIFICANT CHANGE UP (ref 6–8.3)
PROT SERPL-MCNC: 6.1 G/DL — SIGNIFICANT CHANGE UP (ref 6–8.3)
PROTHROM AB SERPL-ACNC: 9.5 SEC — SIGNIFICANT CHANGE UP (ref 9.5–13)
PROTHROM AB SERPL-ACNC: 9.5 SEC — SIGNIFICANT CHANGE UP (ref 9.5–13)
RBC # BLD: 3.33 M/UL — LOW (ref 3.8–5.2)
RBC # BLD: 3.33 M/UL — LOW (ref 3.8–5.2)
RBC # FLD: 12.9 % — SIGNIFICANT CHANGE UP (ref 10.3–14.5)
RBC # FLD: 12.9 % — SIGNIFICANT CHANGE UP (ref 10.3–14.5)
SODIUM SERPL-SCNC: 138 MMOL/L — SIGNIFICANT CHANGE UP (ref 135–145)
SODIUM SERPL-SCNC: 138 MMOL/L — SIGNIFICANT CHANGE UP (ref 135–145)
URATE SERPL-MCNC: 6.6 MG/DL — SIGNIFICANT CHANGE UP (ref 2.5–7)
URATE SERPL-MCNC: 6.6 MG/DL — SIGNIFICANT CHANGE UP (ref 2.5–7)
WBC # BLD: 7.29 K/UL — SIGNIFICANT CHANGE UP (ref 3.8–10.5)
WBC # BLD: 7.29 K/UL — SIGNIFICANT CHANGE UP (ref 3.8–10.5)
WBC # FLD AUTO: 7.29 K/UL — SIGNIFICANT CHANGE UP (ref 3.8–10.5)
WBC # FLD AUTO: 7.29 K/UL — SIGNIFICANT CHANGE UP (ref 3.8–10.5)

## 2024-01-11 RX ORDER — VALACYCLOVIR 500 MG/1
1000 TABLET, FILM COATED ORAL DAILY
Refills: 0 | Status: DISCONTINUED | OUTPATIENT
Start: 2024-01-11 | End: 2024-01-12

## 2024-01-11 RX ORDER — NIFEDIPINE 30 MG
30 TABLET, EXTENDED RELEASE 24 HR ORAL EVERY 24 HOURS
Refills: 0 | Status: DISCONTINUED | OUTPATIENT
Start: 2024-01-11 | End: 2024-01-11

## 2024-01-11 RX ORDER — VALACYCLOVIR 500 MG/1
1 TABLET, FILM COATED ORAL
Qty: 0 | Refills: 0 | DISCHARGE
Start: 2024-01-11

## 2024-01-11 RX ORDER — NIFEDIPINE 30 MG
1 TABLET, EXTENDED RELEASE 24 HR ORAL
Qty: 0 | Refills: 0 | DISCHARGE
Start: 2024-01-11

## 2024-01-11 RX ORDER — DIPHENHYDRAMINE HCL 50 MG
25 CAPSULE ORAL EVERY 4 HOURS
Refills: 0 | Status: DISCONTINUED | OUTPATIENT
Start: 2024-01-11 | End: 2024-01-12

## 2024-01-11 RX ORDER — NIFEDIPINE 30 MG
30 TABLET, EXTENDED RELEASE 24 HR ORAL EVERY 24 HOURS
Refills: 0 | Status: DISCONTINUED | OUTPATIENT
Start: 2024-01-11 | End: 2024-01-12

## 2024-01-11 RX ADMIN — Medication 975 MILLIGRAM(S): at 02:20

## 2024-01-11 RX ADMIN — Medication 600 MILLIGRAM(S): at 20:15

## 2024-01-11 RX ADMIN — Medication 30 MILLIGRAM(S): at 12:25

## 2024-01-11 RX ADMIN — Medication 975 MILLIGRAM(S): at 20:15

## 2024-01-11 RX ADMIN — Medication 975 MILLIGRAM(S): at 09:00

## 2024-01-11 RX ADMIN — HEPARIN SODIUM 5000 UNIT(S): 5000 INJECTION INTRAVENOUS; SUBCUTANEOUS at 06:54

## 2024-01-11 RX ADMIN — VALACYCLOVIR 1000 MILLIGRAM(S): 500 TABLET, FILM COATED ORAL at 08:58

## 2024-01-11 RX ADMIN — Medication 975 MILLIGRAM(S): at 08:54

## 2024-01-11 RX ADMIN — Medication 975 MILLIGRAM(S): at 03:20

## 2024-01-11 RX ADMIN — Medication 600 MILLIGRAM(S): at 02:20

## 2024-01-11 RX ADMIN — Medication 975 MILLIGRAM(S): at 19:32

## 2024-01-11 RX ADMIN — Medication 1 APPLICATION(S): at 06:49

## 2024-01-11 RX ADMIN — Medication 600 MILLIGRAM(S): at 08:54

## 2024-01-11 RX ADMIN — Medication 1 APPLICATION(S): at 19:35

## 2024-01-11 RX ADMIN — Medication 600 MILLIGRAM(S): at 19:32

## 2024-01-11 RX ADMIN — Medication 600 MILLIGRAM(S): at 03:20

## 2024-01-11 NOTE — CHART NOTE - NSCHARTNOTEFT_GEN_A_CORE
TN Diagnosis     Diagnosis to be reviewed with patient on postpartum rounds today    24hr BP range as below  BP x 24 hours: BP:  (121/70 - 149/69)    T(C): 36.7 (01-11-24 @ 06:06), Max: 37.1 (01-10-24 @ 17:31)  T(F): 98 (01-11-24 @ 06:06), Max: 98.7 (01-10-24 @ 17:31)  HR: 65 (01-11-24 @ 06:06) (65 - 91)  BP: 140/91 (01-11-24 @ 06:06) (121/70 - 149/69)  RR: 17 (01-11-24 @ 06:06) (16 - 17)  SpO2: 100% (01-11-24 @ 06:06) (98% - 100%)  Wt(kg): --    --------------------------------------------------------------------------    PLAN    1. HELLP labs ordered now    2. PC ratio deferred as patient is postpartum    3. not on any hypertensive meds, will consider starting Procardia     4. continue to monitor        Discussed with Primary PP NINA Everett    Discussed with PP Resident MD Lo    Discussed with MD Vikas Fay

## 2024-01-11 NOTE — DISCHARGE NOTE OB - IF BREASTFEEDING, HAVE A GLASS OF WATER, JUICE, OR LOW-FAT MILK EACH TIME YOU FEED YOUR BABY
mouth structures, respiratory distress, cardiac disease exacerbation, potential arrhythmias, and even death.  The patient verbalized understanding of each of these risk  The patient was thoroughly informed regarding the Treatment Plan.  Through shared decision making the patient elected to proceed with surgery and consented to the procedure.         Past Medical History:   Diagnosis Date    Atrial fibrillation (HCC)     followed by dr fontenot    Gout     Hyperlipidemia     Long term (current) use of anticoagulants     Eliquis    Nonrheumatic mitral valve regurgitation     followed by dr fontenot    Right shoulder pain     Tinnitus      Past Surgical History:   Procedure Laterality Date    CATARACT REMOVAL Bilateral 2016    with lens implants    COLONOSCOPY      HERNIA REPAIR Left     ORTHOPEDIC SURGERY Left 2018    shoulder surg    ORTHOPEDIC SURGERY Left     left toe surg with screw in place     Allergies   Allergen Reactions    Cefuroxime Axetil Hives    Celecoxib Hives    Cefuroxime Rash    Tramadol Rash         Current Outpatient Medications:     metoprolol succinate (TOPROL XL) 25 MG extended release tablet, Take 1 tablet by mouth daily, Disp: 90 tablet, Rfl: 1    apixaban (ELIQUIS) 5 MG TABS tablet, Take 1 tablet by mouth 2 times daily, Disp: 180 tablet, Rfl: 3    sildenafil (VIAGRA) 100 MG tablet, Take 0.5-1 tablets by mouth daily as needed for Erectile Dysfunction (prn), Disp: 32 tablet, Rfl: 11    Zinc Gluconate 30 MG TABS, , Disp: , Rfl:     TURMERIC CURCUMIN PO, , Disp: , Rfl:     Cholecalciferol (VITAMIN D3) 25 MCG (1000 UT) CAPS, , Disp: , Rfl:     DENTA 5000 PLUS 1.1 % CREA, USE AS DIRECTED OR BRUSH 3 TIMES A DAY FOR 30 SECONDS, Disp: , Rfl:     valACYclovir (VALTREX) 1 g tablet, Take 1 tablet by mouth daily, Disp: 90 tablet, Rfl: 3    colchicine (COLCRYS) 0.6 MG tablet, Take by mouth daily as needed, Disp: , Rfl:     atorvastatin (LIPITOR) 40 MG tablet, TAKE 1 TABLET BY MOUTH EVERY DAY AT NIGHT, Disp: 
Statement Selected

## 2024-01-11 NOTE — PROGRESS NOTE ADULT - SUBJECTIVE AND OBJECTIVE BOX
Post-Operative Note, C/S  She is a  33y woman who is now post-operative day: 4    Subjective:  The patient feels well.  She is ambulating.   She is tolerating regular diet.  She denies nausea and vomiting; denies fever.  She is voiding.  Her pain is controlled; incisional pain is appropriate.  She reports normal postpartum bleeding.  She is pumping    Physical exam:    Vital Signs Last 24 Hrs  T(C): 36.7 (11 Jan 2024 06:06), Max: 37.1 (10 Ermias 2024 17:31)  T(F): 98 (11 Jan 2024 06:06), Max: 98.7 (10 Ermias 2024 17:31)  HR: 65 (11 Jan 2024 06:06) (65 - 91)  BP: 140/91 (11 Jan 2024 06:06) (121/70 - 149/69)  BP(mean): --  RR: 17 (11 Jan 2024 06:06) (16 - 17)  SpO2: 100% (11 Jan 2024 06:06) (98% - 100%)    Parameters below as of 11 Jan 2024 06:06  Patient On (Oxygen Delivery Method): room air        Gen: NAD  Breast: Soft, nontender, not engorged.  Abdomen: Soft, nontender, no distension , firm uterine fundus at umbilicus.  Incision: C/D/I.  Pelvic: Normal lochia noted  Ext: No calf tenderness    LABS:               CBC (01-11 @ 07:39)                          10.0<L>                   7.29    )--------------(  267        70.6  % Neuts, 21.1  % Lymphs, ANC: 5.15                            29.2<L>    BMP (01-11 @ 07:39)       138     |  104     |  16    			Ca++ --      Ca 9.5          ---------------------------------( 101<H>		Mg --           4.4     |  22      |  0.86  			Ph --        LFTs (01-11 @ 07:39)      TPro 6.1 / Alb 3.0<L> / TBili <0.2 / DBili -- / AST 21 / ALT 18 / AlkPhos 78    Coags (01-11 @ 07:39)  aPTT 28.7 / INR <0.90<L> / PT 9.5          Urinalysis (01-11 @ 07:39):     Color:  / Appearance:  / SG:  / pH:  / Gluc: 101<H> / Ketones:  / Bili:  / Urobili:  / Protein : / Nitrites:  / Leuk.Est:  / RBC:  / WBC:  / Sq Epi:  / Non Sq Epi:  / Bacteria      Urinalysis (01-07 @ 06:00):     Color:  / Appearance:  / SG:  / pH:  / Gluc: 79 / Ketones:  / Bili:  / Urobili:  / Protein : / Nitrites:  / Leuk.Est:  / RBC:  / WBC:  / Sq Epi:  / Non Sq Epi:  / Bacteria        -> Clean Catch Clean Catch (Midstream) Culture (01-06 @ 19:51)     NG    NG    <10,000 CFU/mL Normal Urogenital Mita    -> .Beta Strep Vaginal/Rectal Culture (01-06 @ 15:50)     NG    NG    No Beta Strep group B isolated        Rubella status:     Allergies    penicillin (Other)    Intolerances      MEDICATIONS  (STANDING):  acetaminophen     Tablet .. 975 milliGRAM(s) Oral <User Schedule>  ascorbic acid 500 milliGRAM(s) Oral daily  betamethasone Injectable 12 milliGRAM(s) IntraMuscular every 24 hours  chlorhexidine 2% Cloths 1 Application(s) Topical daily  clindamycin   Capsule 300 milliGRAM(s) Oral every 8 hours  diphtheria/tetanus/pertussis (acellular) Vaccine (Adacel) 0.5 milliLiter(s) IntraMuscular once  ferrous    sulfate 325 milliGRAM(s) Oral daily  heparin   Injectable 5000 Unit(s) SubCutaneous every 12 hours  hydrocortisone 1% Cream 1 Application(s) Topical two times a day  ibuprofen  Tablet. 600 milliGRAM(s) Oral every 6 hours  lactated ringers. 1000 milliLiter(s) (125 mL/Hr) IV Continuous <Continuous>  lactated ringers. 1000 milliLiter(s) (125 mL/Hr) IV Continuous <Continuous>  morphine PF Spinal 0.1 milliGRAM(s) IntraThecal. once  NIFEdipine XL 30 milliGRAM(s) Oral every 24 hours  oxytocin Infusion 333.333 milliUNIT(s)/Min (1000 mL/Hr) IV Continuous <Continuous>  valACYclovir 1000 milliGRAM(s) Oral daily    MEDICATIONS  (PRN):  dexAMETHasone  Injectable 4 milliGRAM(s) IV Push every 6 hours PRN Nausea  diphenhydrAMINE 25 milliGRAM(s) Oral every 4 hours PRN Rash and/or Itching  diphenhydrAMINE 25 milliGRAM(s) Oral every 6 hours PRN Pruritus  lanolin Ointment 1 Application(s) Topical every 6 hours PRN Sore Nipples  magnesium hydroxide Suspension 30 milliLiter(s) Oral two times a day PRN Constipation  naloxone Injectable 0.1 milliGRAM(s) IV Push every 3 minutes PRN For ANY of the following changes in patient status:  A. Breaths Per Minute LESS THAN 10, B. Oxygen saturation LESS THAN 90%, C. Sedation score of 6 for Stop After: 4 Times  ondansetron Injectable 4 milliGRAM(s) IV Push every 6 hours PRN Nausea  oxyCODONE    IR 5 milliGRAM(s) Oral every 3 hours PRN Moderate to Severe Pain (4-10)  oxyCODONE    IR 5 milliGRAM(s) Oral once PRN Moderate to Severe Pain (4-10)  simethicone 80 milliGRAM(s) Chew every 4 hours PRN Gas        Assessment and Plan  POD #4 s/p C/S for PPROM + Breech presentation. Overnight and this morning patient noted to have persistently elevated blood pressures. Patient denies any pre-eclamptic symptoms.  Reviewed pre-eclampsia signs and symptoms  Discussed starting Procardia XL 30mg - patient wanted to check BP again first - still elevated. Patient agreed to medication.   HELLP labs reviewed - WNL  Continue to monitor BPs  Doing well.  Encourage ambulation.  Incisional care and PO instructions reviewed.  CPC.  Discharge held

## 2024-01-12 VITALS — WEIGHT: 189.6 LBS

## 2024-01-12 DIAGNOSIS — O13.9 GESTATIONAL [PREGNANCY-INDUCED] HYPERTENSION WITHOUT SIGNIFICANT PROTEINURIA, UNSPECIFIED TRIMESTER: ICD-10-CM

## 2024-01-12 RX ADMIN — Medication 975 MILLIGRAM(S): at 03:00

## 2024-01-12 RX ADMIN — Medication 600 MILLIGRAM(S): at 09:00

## 2024-01-12 RX ADMIN — Medication 30 MILLIGRAM(S): at 12:42

## 2024-01-12 RX ADMIN — Medication 975 MILLIGRAM(S): at 09:46

## 2024-01-12 RX ADMIN — Medication 975 MILLIGRAM(S): at 09:00

## 2024-01-12 RX ADMIN — Medication 975 MILLIGRAM(S): at 02:02

## 2024-01-12 RX ADMIN — Medication 325 MILLIGRAM(S): at 12:43

## 2024-01-12 RX ADMIN — VALACYCLOVIR 1000 MILLIGRAM(S): 500 TABLET, FILM COATED ORAL at 12:42

## 2024-01-12 RX ADMIN — Medication 600 MILLIGRAM(S): at 02:01

## 2024-01-12 RX ADMIN — Medication 600 MILLIGRAM(S): at 03:00

## 2024-01-12 NOTE — DIETITIAN INITIAL EVALUATION ADULT - PERTINENT LABORATORY DATA
01-11    138  |  104  |  16  ----------------------------<  101<H>  4.4   |  22  |  0.86    Ca    9.5      11 Jan 2024 07:39    TPro  6.1  /  Alb  3.0<L>  /  TBili  <0.2  /  DBili  x   /  AST  21  /  ALT  18  /  AlkPhos  78  01-11

## 2024-01-12 NOTE — DIETITIAN INITIAL EVALUATION ADULT - OTHER INFO
OB Attending 1/11/24: POD #4 s/p C/S for PPROM + Breech presentation. Patient being monitored for elevated blood pressures.    Patient noted with good diet tolerance / po intake.  + Breastfeeding per chart.  Patient being assessed by team at time of RD visit. No noted GI distress i.e. nausea, vomiting, diarrhea. No food allergies on chart. Pre-pregnancy weight used for needs of nutrition assessment, 86kg as noted in RN patient profile.

## 2024-01-12 NOTE — PROGRESS NOTE ADULT - SUBJECTIVE AND OBJECTIVE BOX
Post-Operative Note, C/S  She is a  33y woman who is now post-operative day: 5    Subjective:  The patient feels well.  She is ambulating.   She is tolerating regular diet.  She denies nausea and vomiting; denies fever.  She is voiding.  Her pain is controlled; incisional pain is appropriate.  She reports normal postpartum bleeding.  She is breastfeeding.  She denies HA, blurry vision, epigastric pain, SOB, CP, dizziness or lightheadedness    Physical exam:    Vital Signs Last 24 Hrs  T(C): 36.9 (12 Jan 2024 05:50), Max: 37.1 (11 Jan 2024 22:32)  T(F): 98.5 (12 Jan 2024 05:50), Max: 98.8 (11 Jan 2024 22:32)  HR: 80 (12 Jan 2024 05:50) (76 - 83)  BP: 127/71 (12 Jan 2024 05:50) (102/62 - 135/78)  BP(mean): --  RR: 17 (12 Jan 2024 05:50) (16 - 17)  SpO2: 100% (12 Jan 2024 05:50) (98% - 100%)    Parameters below as of 12 Jan 2024 05:50  Patient On (Oxygen Delivery Method): room air        Gen: NAD  Breast: Soft, nontender, not engorged.  Abdomen: Soft, nontender, no distension , firm uterine fundus at umbilicus.  Incision: C/D/I.  Pelvic: Normal lochia noted  Ext: No calf tenderness    LABS:                        10.0   7.29  )-----------( 267      ( 11 Jan 2024 07:39 )             29.2         Allergies    penicillin (Other)      MEDICATIONS  (STANDING):  acetaminophen     Tablet .. 975 milliGRAM(s) Oral <User Schedule>  ascorbic acid 500 milliGRAM(s) Oral daily  betamethasone Injectable 12 milliGRAM(s) IntraMuscular every 24 hours  chlorhexidine 2% Cloths 1 Application(s) Topical daily  clindamycin   Capsule 300 milliGRAM(s) Oral every 8 hours  diphtheria/tetanus/pertussis (acellular) Vaccine (Adacel) 0.5 milliLiter(s) IntraMuscular once  ferrous    sulfate 325 milliGRAM(s) Oral daily  heparin   Injectable 5000 Unit(s) SubCutaneous every 12 hours  hydrocortisone 1% Cream 1 Application(s) Topical two times a day  ibuprofen  Tablet. 600 milliGRAM(s) Oral every 6 hours  lactated ringers. 1000 milliLiter(s) (125 mL/Hr) IV Continuous <Continuous>  lactated ringers. 1000 milliLiter(s) (125 mL/Hr) IV Continuous <Continuous>  morphine PF Spinal 0.1 milliGRAM(s) IntraThecal. once  NIFEdipine XL 30 milliGRAM(s) Oral every 24 hours  oxytocin Infusion 333.333 milliUNIT(s)/Min (1000 mL/Hr) IV Continuous <Continuous>  valACYclovir 1000 milliGRAM(s) Oral daily    MEDICATIONS  (PRN):  dexAMETHasone  Injectable 4 milliGRAM(s) IV Push every 6 hours PRN Nausea  diphenhydrAMINE 25 milliGRAM(s) Oral every 6 hours PRN Pruritus  diphenhydrAMINE 25 milliGRAM(s) Oral every 4 hours PRN Rash and/or Itching  lanolin Ointment 1 Application(s) Topical every 6 hours PRN Sore Nipples  magnesium hydroxide Suspension 30 milliLiter(s) Oral two times a day PRN Constipation  naloxone Injectable 0.1 milliGRAM(s) IV Push every 3 minutes PRN For ANY of the following changes in patient status:  A. Breaths Per Minute LESS THAN 10, B. Oxygen saturation LESS THAN 90%, C. Sedation score of 6 for Stop After: 4 Times  ondansetron Injectable 4 milliGRAM(s) IV Push every 6 hours PRN Nausea  oxyCODONE    IR 5 milliGRAM(s) Oral once PRN Moderate to Severe Pain (4-10)  oxyCODONE    IR 5 milliGRAM(s) Oral every 3 hours PRN Moderate to Severe Pain (4-10)  simethicone 80 milliGRAM(s) Chew every 4 hours PRN Gas

## 2024-01-12 NOTE — DIETITIAN INITIAL EVALUATION ADULT - ADD RECOMMEND
1) Monitor weights, PO intake/diet tolerance, skin integrity, pertinent labs.   2) Please consistently document % PO intake in nursing flowsheets to assess adequacy of nutritional intake/monitor need for further nutritional intervention(s).   3) Consider provision of prenatal multivitamin daily.

## 2024-01-12 NOTE — DIETITIAN INITIAL EVALUATION ADULT - NAME AND PHONE
Sirena Cross, MS, RDN, CDN  Pager [phone]  Also available on MS Teams  Sirena Cross, MS, RDN, CDN  Pager [phone]  Also available on MS Teams

## 2024-01-12 NOTE — DIETITIAN INITIAL EVALUATION ADULT - PERTINENT MEDS FT
MEDICATIONS  (STANDING):  acetaminophen     Tablet .. 975 milliGRAM(s) Oral <User Schedule>  ascorbic acid 500 milliGRAM(s) Oral daily  betamethasone Injectable 12 milliGRAM(s) IntraMuscular every 24 hours  chlorhexidine 2% Cloths 1 Application(s) Topical daily  clindamycin   Capsule 300 milliGRAM(s) Oral every 8 hours  diphtheria/tetanus/pertussis (acellular) Vaccine (Adacel) 0.5 milliLiter(s) IntraMuscular once  ferrous    sulfate 325 milliGRAM(s) Oral daily  heparin   Injectable 5000 Unit(s) SubCutaneous every 12 hours  hydrocortisone 1% Cream 1 Application(s) Topical two times a day  ibuprofen  Tablet. 600 milliGRAM(s) Oral every 6 hours  lactated ringers. 1000 milliLiter(s) (125 mL/Hr) IV Continuous <Continuous>  lactated ringers. 1000 milliLiter(s) (125 mL/Hr) IV Continuous <Continuous>  morphine PF Spinal 0.1 milliGRAM(s) IntraThecal. once  NIFEdipine XL 30 milliGRAM(s) Oral every 24 hours  oxytocin Infusion 333.333 milliUNIT(s)/Min (1000 mL/Hr) IV Continuous <Continuous>  valACYclovir 1000 milliGRAM(s) Oral daily    MEDICATIONS  (PRN):  dexAMETHasone  Injectable 4 milliGRAM(s) IV Push every 6 hours PRN Nausea  diphenhydrAMINE 25 milliGRAM(s) Oral every 4 hours PRN Rash and/or Itching  diphenhydrAMINE 25 milliGRAM(s) Oral every 6 hours PRN Pruritus  lanolin Ointment 1 Application(s) Topical every 6 hours PRN Sore Nipples  magnesium hydroxide Suspension 30 milliLiter(s) Oral two times a day PRN Constipation  naloxone Injectable 0.1 milliGRAM(s) IV Push every 3 minutes PRN For ANY of the following changes in patient status:  A. Breaths Per Minute LESS THAN 10, B. Oxygen saturation LESS THAN 90%, C. Sedation score of 6 for Stop After: 4 Times  ondansetron Injectable 4 milliGRAM(s) IV Push every 6 hours PRN Nausea  oxyCODONE    IR 5 milliGRAM(s) Oral every 3 hours PRN Moderate to Severe Pain (4-10)  oxyCODONE    IR 5 milliGRAM(s) Oral once PRN Moderate to Severe Pain (4-10)  simethicone 80 milliGRAM(s) Chew every 4 hours PRN Gas

## 2024-01-12 NOTE — PROGRESS NOTE ADULT - ASSESSMENT
Patient seen at bedside resting comfortably offers no new complaints. not yet ambulating, caicedo just removed no void spontaneously yet.  + flatus;  no bm; tolerating clr liq diet. both breast and bottle feeding. Denies HA, blurry vision or epigastric pain, CP, SOB, N/V/D,  dizziness, palpitations, worsening vaginal bleeding.    Vital Signs Last 24 Hrs  T(C): 36.7 (08 Jan 2024 10:41), Max: 36.9 (07 Jan 2024 20:55)  T(F): 98.1 (08 Jan 2024 10:41), Max: 98.5 (07 Jan 2024 20:55)  HR: 92 (08 Jan 2024 10:41) (62 - 101)  BP: 130/78 (08 Jan 2024 10:41) (102/81 - 137/78)  BP(mean): 82 (07 Jan 2024 16:30) (67 - 87)  RR: 17 (08 Jan 2024 10:41) (12 - 20)  SpO2: 98% (08 Jan 2024 10:41) (93% - 100%)    Parameters below as of 08 Jan 2024 10:41  Patient On (Oxygen Delivery Method): room air        Gen: A&O x 3, NAD  Chest: CTA B/L  Cardiac: S1,S2  RRR  Breast: Soft, nontender, nonengorged  Abdomen: +BS; soft; Nontender, nondistended; dressing removed incision C/D/I steri strips in place  Gyn: minimal lochia   Extremities: Nontender, venodynes in place                          10.8   11.05 )-----------( 210      ( 08 Jan 2024 05:20 )             31.2       A/P: 33yr old F POD #1 s/p p/c/s on 01/07/2024 for breech presentation    - Meeting PP milestones   - Pain management as needed  - OOB and ambulate  - Incision C/D/I (steri strips)  - Incision care discussed   - Encourage breastfeeding/pumping     -d/w Stan Valdivia NP 
32yo  at 33w4d a/f PPROM at 33w3d, endorses leakage w/ now increased contractions, will continue to monitoring on L&D, VSS, maternal and fetal status overall reassurin. PPROM  -c/w latency antibiotics: s/p azithro, started on gent/clinda( - )  -c/w betamethasone for fetal lung maturity (for dose #2 at 2:55p)  -continue to monitor fetal status and for signs of labor or infection  - /-3 on presentation, external os 1-2cm on speculum exam this AM, unchanged    2.  Maternal well-being  -NPO given BREECH presentation on admission  -SCDs, and ambulation for DVT prophylaxis    3.  Fetal well being   -Continuous monitoring while on L&D  -ATU sono twice weekly  -prenatal vitamin  - NICU consulted    4.  Delivery  -delivery at 34 w or earlier if fetal or maternal status deteriorates    Amyeo Afroz Jereen, PGY-3      
Patient seen at bedside resting comfortably offers no new complaints. + Ambulation, + void without difficulty, + flatus;  no bm;  tolerating regular diet. both breastfeeding and bottle feeding. Denies HA, blurry vision or epigastric pain, CP, SOB, N/V/D,  dizziness, palpitations, worsening vaginal bleeding.     Vital Signs Last 24 Hrs  T(C): 36.9 (10 Ermias 2024 06:27), Max: 37.3 (09 Jan 2024 10:01)  T(F): 98.4 (10 Ermias 2024 06:27), Max: 99.2 (09 Jan 2024 10:01)  HR: 75 (10 Ermias 2024 06:27) (75 - 91)  BP: 130/67 (10 Ermias 2024 06:27) (123/81 - 142/70)  BP(mean): --  RR: 17 (10 Ermias 2024 06:27) (16 - 19)  SpO2: 99% (10 Ermias 2024 06:27) (96% - 100%)    Parameters below as of 10 Ermias 2024 06:27  Patient On (Oxygen Delivery Method): room air        Gen: A&O x 3, NAD  Chest: CTA B/L  Cardiac: S1,S2  RRR  Breast: Soft, nontender, nonengorged  Abdomen: +BS; soft; Nontender, nondistended, Incision C/D/I steri strips in place   Gyn: Minimal lochia  Extremities: Nontender, DTRS 2+, no worsening edema        A/P: 33yr old F POD #3 s/p p/c/s on 1/7/2024 @ 33wks (PPROM/Breech)    - Meeting PP milestones   - Incision C/D/I   - Incision care discussed   - Discharge planning for 1/11/2024    -d/w dr Micaela Valdivia NP 
Assessment and Plan  POD #5 s/p C/S.  Doing well.  GHTN  ·	cotninue to monitor bp's- rx bp cuff sent to at home pharmacy  ·	pt to check TID, keep logs and bring to each visit  ·	logs given to pt  ·	pt to call office for bp's >140/90  ·	PEC precautions reviewed, and handouts given  ·	pt to continue Procardia 30xl daily- rx sent to at home pharmacy  ·	hold parameters reviewed- hold for bp <110/60  cold sore  ·	continue valtrex as prescribed- rx sent to at home pharmacy for continued need  Encourage ambulation.  Incisional care and PO instructions reviewed.  CPC.  Discharge home today  RTO 3-4 days for bp check/PRN

## 2024-01-26 LAB — SURGICAL PATHOLOGY STUDY: SIGNIFICANT CHANGE UP

## 2024-03-03 ENCOUNTER — APPOINTMENT (OUTPATIENT)
Dept: MRI IMAGING | Facility: CLINIC | Age: 34
End: 2024-03-03
Payer: COMMERCIAL

## 2024-03-03 ENCOUNTER — OUTPATIENT (OUTPATIENT)
Dept: OUTPATIENT SERVICES | Facility: HOSPITAL | Age: 34
LOS: 1 days | End: 2024-03-03
Payer: COMMERCIAL

## 2024-03-03 DIAGNOSIS — Z98.890 OTHER SPECIFIED POSTPROCEDURAL STATES: Chronic | ICD-10-CM

## 2024-03-03 DIAGNOSIS — Z33.2 ENCOUNTER FOR ELECTIVE TERMINATION OF PREGNANCY: Chronic | ICD-10-CM

## 2024-03-03 DIAGNOSIS — Z96.22 MYRINGOTOMY TUBE(S) STATUS: Chronic | ICD-10-CM

## 2024-03-03 DIAGNOSIS — K08.499 PARTIAL LOSS OF TEETH DUE TO OTHER SPECIFIED CAUSE, UNSPECIFIED CLASS: Chronic | ICD-10-CM

## 2024-03-03 DIAGNOSIS — Z00.8 ENCOUNTER FOR OTHER GENERAL EXAMINATION: ICD-10-CM

## 2024-03-03 PROBLEM — G43.909 MIGRAINE, UNSPECIFIED, NOT INTRACTABLE, WITHOUT STATUS MIGRAINOSUS: Chronic | Status: ACTIVE | Noted: 2017-11-09

## 2024-03-03 PROBLEM — N87.9 DYSPLASIA OF CERVIX UTERI, UNSPECIFIED: Chronic | Status: ACTIVE | Noted: 2017-11-09

## 2024-03-03 PROBLEM — G58.8 OTHER SPECIFIED MONONEUROPATHIES: Chronic | Status: ACTIVE | Noted: 2017-11-09

## 2024-03-03 PROCEDURE — 70551 MRI BRAIN STEM W/O DYE: CPT

## 2024-03-03 PROCEDURE — 70551 MRI BRAIN STEM W/O DYE: CPT | Mod: 26

## 2024-03-04 RX ORDER — ASPIRIN/CALCIUM CARB/MAGNESIUM 324 MG
0 TABLET ORAL
Refills: 0 | DISCHARGE

## 2024-11-08 NOTE — ASU DISCHARGE PLAN (ADULT/PEDIATRIC). - INSTRUCTIONS
****Call the office with any problems including but not limited to heavy vaginal bleeding, fevers, severe abdominal pain, inability to eat/drink/urinate  **** Nothing in the vagina x2 weeks-( No sex, tampons, douching )  *****You may shower as usual but no hot tubs, bath tubs, swimming pools x2 weeks. Advance as tolerated done
